# Patient Record
Sex: FEMALE | Race: WHITE | NOT HISPANIC OR LATINO | Employment: OTHER | URBAN - METROPOLITAN AREA
[De-identification: names, ages, dates, MRNs, and addresses within clinical notes are randomized per-mention and may not be internally consistent; named-entity substitution may affect disease eponyms.]

---

## 2024-09-27 ENCOUNTER — HOSPITAL ENCOUNTER (OUTPATIENT)
Dept: RADIOLOGY | Facility: MEDICAL CENTER | Age: 74
End: 2024-09-27

## 2024-09-27 ENCOUNTER — HOSPITAL ENCOUNTER (INPATIENT)
Facility: MEDICAL CENTER | Age: 74
End: 2024-09-27
Attending: INTERNAL MEDICINE | Admitting: INTERNAL MEDICINE
Payer: COMMERCIAL

## 2024-09-27 DIAGNOSIS — S72.002A CLOSED DISPLACED FRACTURE OF LEFT FEMORAL NECK (HCC): ICD-10-CM

## 2024-09-27 PROBLEM — Z01.810 PREOP CARDIOVASCULAR EXAM: Status: ACTIVE | Noted: 2024-09-27

## 2024-09-27 PROBLEM — E87.1 HYPONATREMIA: Status: ACTIVE | Noted: 2024-09-27

## 2024-09-27 PROCEDURE — 700111 HCHG RX REV CODE 636 W/ 250 OVERRIDE (IP): Mod: JZ | Performed by: HOSPITALIST

## 2024-09-27 PROCEDURE — 770001 HCHG ROOM/CARE - MED/SURG/GYN PRIV*

## 2024-09-27 PROCEDURE — 700105 HCHG RX REV CODE 258: Performed by: HOSPITALIST

## 2024-09-27 PROCEDURE — 99223 1ST HOSP IP/OBS HIGH 75: CPT | Mod: AI | Performed by: HOSPITALIST

## 2024-09-27 RX ORDER — HYDROMORPHONE HYDROCHLORIDE 1 MG/ML
0.25 INJECTION, SOLUTION INTRAMUSCULAR; INTRAVENOUS; SUBCUTANEOUS
Status: DISCONTINUED | OUTPATIENT
Start: 2024-09-27 | End: 2024-09-28

## 2024-09-27 RX ORDER — ONDANSETRON 4 MG/1
4 TABLET, ORALLY DISINTEGRATING ORAL EVERY 4 HOURS PRN
Status: DISCONTINUED | OUTPATIENT
Start: 2024-09-27 | End: 2024-10-02 | Stop reason: HOSPADM

## 2024-09-27 RX ORDER — SODIUM CHLORIDE 9 MG/ML
INJECTION, SOLUTION INTRAVENOUS CONTINUOUS
Status: DISCONTINUED | OUTPATIENT
Start: 2024-09-27 | End: 2024-09-29

## 2024-09-27 RX ORDER — ACETAMINOPHEN 325 MG/1
650 TABLET ORAL EVERY 6 HOURS PRN
Status: DISCONTINUED | OUTPATIENT
Start: 2024-09-27 | End: 2024-09-27

## 2024-09-27 RX ORDER — AMOXICILLIN 250 MG
2 CAPSULE ORAL 2 TIMES DAILY
Status: DISCONTINUED | OUTPATIENT
Start: 2024-09-27 | End: 2024-09-28

## 2024-09-27 RX ORDER — POLYETHYLENE GLYCOL 3350 17 G/17G
1 POWDER, FOR SOLUTION ORAL
Status: DISCONTINUED | OUTPATIENT
Start: 2024-09-27 | End: 2024-09-28

## 2024-09-27 RX ORDER — ONDANSETRON 2 MG/ML
4 INJECTION INTRAMUSCULAR; INTRAVENOUS EVERY 4 HOURS PRN
Status: DISCONTINUED | OUTPATIENT
Start: 2024-09-27 | End: 2024-09-28

## 2024-09-27 RX ORDER — HYDROMORPHONE HYDROCHLORIDE 1 MG/ML
0.5 INJECTION, SOLUTION INTRAMUSCULAR; INTRAVENOUS; SUBCUTANEOUS
Status: DISCONTINUED | OUTPATIENT
Start: 2024-09-27 | End: 2024-09-28

## 2024-09-27 RX ORDER — ACETAMINOPHEN 325 MG/1
650 TABLET ORAL EVERY 6 HOURS PRN
Status: DISCONTINUED | OUTPATIENT
Start: 2024-09-27 | End: 2024-09-28

## 2024-09-27 RX ORDER — ONDANSETRON 2 MG/ML
4 INJECTION INTRAMUSCULAR; INTRAVENOUS EVERY 4 HOURS PRN
Status: DISCONTINUED | OUTPATIENT
Start: 2024-09-27 | End: 2024-09-27

## 2024-09-27 RX ADMIN — SODIUM CHLORIDE: 9 INJECTION, SOLUTION INTRAVENOUS at 18:32

## 2024-09-27 RX ADMIN — HYDROMORPHONE HYDROCHLORIDE 0.5 MG: 1 INJECTION, SOLUTION INTRAMUSCULAR; INTRAVENOUS; SUBCUTANEOUS at 19:55

## 2024-09-27 SDOH — ECONOMIC STABILITY: TRANSPORTATION INSECURITY
IN THE PAST 12 MONTHS, HAS LACK OF RELIABLE TRANSPORTATION KEPT YOU FROM MEDICAL APPOINTMENTS, MEETINGS, WORK OR FROM GETTING THINGS NEEDED FOR DAILY LIVING?: NO

## 2024-09-27 SDOH — ECONOMIC STABILITY: TRANSPORTATION INSECURITY
IN THE PAST 12 MONTHS, HAS THE LACK OF TRANSPORTATION KEPT YOU FROM MEDICAL APPOINTMENTS OR FROM GETTING MEDICATIONS?: NO

## 2024-09-27 ASSESSMENT — PAIN DESCRIPTION - PAIN TYPE
TYPE: ACUTE PAIN

## 2024-09-27 ASSESSMENT — LIFESTYLE VARIABLES
DOES PATIENT WANT TO STOP DRINKING: NO
EVER FELT BAD OR GUILTY ABOUT YOUR DRINKING: NO
ALCOHOL_USE: YES
ON A TYPICAL DAY WHEN YOU DRINK ALCOHOL HOW MANY DRINKS DO YOU HAVE: 2
TOTAL SCORE: 0
HAVE YOU EVER FELT YOU SHOULD CUT DOWN ON YOUR DRINKING: NO
HOW MANY TIMES IN THE PAST YEAR HAVE YOU HAD 5 OR MORE DRINKS IN A DAY: 0
TOTAL SCORE: 0
HAVE PEOPLE ANNOYED YOU BY CRITICIZING YOUR DRINKING: NO
TOTAL SCORE: 0
EVER HAD A DRINK FIRST THING IN THE MORNING TO STEADY YOUR NERVES TO GET RID OF A HANGOVER: NO
CONSUMPTION TOTAL: POSITIVE
AVERAGE NUMBER OF DAYS PER WEEK YOU HAVE A DRINK CONTAINING ALCOHOL: 7

## 2024-09-27 ASSESSMENT — ENCOUNTER SYMPTOMS
PALPITATIONS: 0
COUGH: 0
INSOMNIA: 0
DEPRESSION: 0
WEAKNESS: 0
DOUBLE VISION: 0
DIZZINESS: 0
SORE THROAT: 0
FEVER: 0
FALLS: 1
HEADACHES: 0
VOMITING: 0
MYALGIAS: 0
NAUSEA: 0
BRUISES/BLEEDS EASILY: 0
BLURRED VISION: 0
SHORTNESS OF BREATH: 0
NECK PAIN: 0

## 2024-09-27 ASSESSMENT — SOCIAL DETERMINANTS OF HEALTH (SDOH)
WITHIN THE PAST 12 MONTHS, THE FOOD YOU BOUGHT JUST DIDN'T LAST AND YOU DIDN'T HAVE MONEY TO GET MORE: NEVER TRUE
IN THE PAST 12 MONTHS, HAS THE ELECTRIC, GAS, OIL, OR WATER COMPANY THREATENED TO SHUT OFF SERVICE IN YOUR HOME?: NO
WITHIN THE LAST YEAR, HAVE YOU BEEN AFRAID OF YOUR PARTNER OR EX-PARTNER?: NO
WITHIN THE LAST YEAR, HAVE TO BEEN RAPED OR FORCED TO HAVE ANY KIND OF SEXUAL ACTIVITY BY YOUR PARTNER OR EX-PARTNER?: NO
WITHIN THE PAST 12 MONTHS, YOU WORRIED THAT YOUR FOOD WOULD RUN OUT BEFORE YOU GOT THE MONEY TO BUY MORE: NEVER TRUE
WITHIN THE LAST YEAR, HAVE YOU BEEN KICKED, HIT, SLAPPED, OR OTHERWISE PHYSICALLY HURT BY YOUR PARTNER OR EX-PARTNER?: NO
WITHIN THE LAST YEAR, HAVE YOU BEEN HUMILIATED OR EMOTIONALLY ABUSED IN OTHER WAYS BY YOUR PARTNER OR EX-PARTNER?: NO

## 2024-09-27 ASSESSMENT — PATIENT HEALTH QUESTIONNAIRE - PHQ9
2. FEELING DOWN, DEPRESSED, IRRITABLE, OR HOPELESS: NOT AT ALL
1. LITTLE INTEREST OR PLEASURE IN DOING THINGS: NOT AT ALL
SUM OF ALL RESPONSES TO PHQ9 QUESTIONS 1 AND 2: 0

## 2024-09-27 NOTE — PROGRESS NOTES
[facility] DIRECT ADMISSION REPORT  [facility]  Transferring physician: Dr. Penny    Chief complaint: left hip pain  Pertinent history & patient course: 73/F without much past medical history, does not take any chronic medications, who had a GLF on 9/13/24. Since then had 2 negative hip xrays but continued to have pain on the left hip. CT now showed femoral neck fracture. [facility] so transferred to [facility].   Pertinent imaging & lab results: as above  Consultants called prior to transfer and pertinent input from consultants: Ortho    Reason for Transfer: Ortho consult, will need surgery  Further work up or recommendations requested prior to transfer: none    Patient accepted for transfer: Yes  Accepting [facility] Facility: [facility] to notify the Triage Coordinator in the RTOC via Voalte or Phone ext. [phone] when patient arrives to the unit. The Triage Coordinator will assign the admitting provider.    Consultants to be called upon arrival: Orthopedics on-call  Admission status: Inpatient.   Floor requested: T3      The admitting provider is the point of contact for questions or concerns regarding patient's care.

## 2024-09-28 ENCOUNTER — ANESTHESIA EVENT (OUTPATIENT)
Dept: SURGERY | Facility: MEDICAL CENTER | Age: 74
End: 2024-09-28
Payer: COMMERCIAL

## 2024-09-28 ENCOUNTER — ANESTHESIA (OUTPATIENT)
Dept: SURGERY | Facility: MEDICAL CENTER | Age: 74
End: 2024-09-28
Payer: COMMERCIAL

## 2024-09-28 LAB
ANION GAP SERPL CALC-SCNC: 13 MMOL/L (ref 7–16)
BUN SERPL-MCNC: 10 MG/DL (ref 8–22)
CALCIUM SERPL-MCNC: 9.2 MG/DL (ref 8.5–10.5)
CHLORIDE SERPL-SCNC: 106 MMOL/L (ref 96–112)
CO2 SERPL-SCNC: 22 MMOL/L (ref 20–33)
CREAT SERPL-MCNC: 0.68 MG/DL (ref 0.5–1.4)
EKG IMPRESSION: NORMAL
ERYTHROCYTE [DISTWIDTH] IN BLOOD BY AUTOMATED COUNT: 42.1 FL (ref 35.9–50)
GFR SERPLBLD CREATININE-BSD FMLA CKD-EPI: 91 ML/MIN/1.73 M 2
GLUCOSE SERPL-MCNC: 106 MG/DL (ref 65–99)
HCT VFR BLD AUTO: 37.5 % (ref 37–47)
HGB BLD-MCNC: 12.8 G/DL (ref 12–16)
MCH RBC QN AUTO: 31.8 PG (ref 27–33)
MCHC RBC AUTO-ENTMCNC: 34.1 G/DL (ref 32.2–35.5)
MCV RBC AUTO: 93.3 FL (ref 81.4–97.8)
PLATELET # BLD AUTO: 336 K/UL (ref 164–446)
PMV BLD AUTO: 9.7 FL (ref 9–12.9)
POTASSIUM SERPL-SCNC: 3.8 MMOL/L (ref 3.6–5.5)
RBC # BLD AUTO: 4.02 M/UL (ref 4.2–5.4)
SODIUM SERPL-SCNC: 141 MMOL/L (ref 135–145)
WBC # BLD AUTO: 6.1 K/UL (ref 4.8–10.8)

## 2024-09-28 PROCEDURE — 700111 HCHG RX REV CODE 636 W/ 250 OVERRIDE (IP): Mod: JZ | Performed by: ANESTHESIOLOGY

## 2024-09-28 PROCEDURE — 700111 HCHG RX REV CODE 636 W/ 250 OVERRIDE (IP): Performed by: ANESTHESIOLOGY

## 2024-09-28 PROCEDURE — 93005 ELECTROCARDIOGRAM TRACING: CPT | Performed by: ORTHOPAEDIC SURGERY

## 2024-09-28 PROCEDURE — 160048 HCHG OR STATISTICAL LEVEL 1-5: Performed by: ORTHOPAEDIC SURGERY

## 2024-09-28 PROCEDURE — 700111 HCHG RX REV CODE 636 W/ 250 OVERRIDE (IP): Performed by: ORTHOPAEDIC SURGERY

## 2024-09-28 PROCEDURE — C1776 JOINT DEVICE (IMPLANTABLE): HCPCS | Performed by: ORTHOPAEDIC SURGERY

## 2024-09-28 PROCEDURE — A9270 NON-COVERED ITEM OR SERVICE: HCPCS | Performed by: ORTHOPAEDIC SURGERY

## 2024-09-28 PROCEDURE — 700105 HCHG RX REV CODE 258: Performed by: ANESTHESIOLOGY

## 2024-09-28 PROCEDURE — 700102 HCHG RX REV CODE 250 W/ 637 OVERRIDE(OP): Performed by: ANESTHESIOLOGY

## 2024-09-28 PROCEDURE — 700105 HCHG RX REV CODE 258: Performed by: ORTHOPAEDIC SURGERY

## 2024-09-28 PROCEDURE — C1713 ANCHOR/SCREW BN/BN,TIS/BN: HCPCS | Performed by: ORTHOPAEDIC SURGERY

## 2024-09-28 PROCEDURE — 160009 HCHG ANES TIME/MIN: Performed by: ORTHOPAEDIC SURGERY

## 2024-09-28 PROCEDURE — 502000 HCHG MISC OR IMPLANTS RC 0278: Performed by: ORTHOPAEDIC SURGERY

## 2024-09-28 PROCEDURE — A9270 NON-COVERED ITEM OR SERVICE: HCPCS | Performed by: ANESTHESIOLOGY

## 2024-09-28 PROCEDURE — 99222 1ST HOSP IP/OBS MODERATE 55: CPT | Mod: 57 | Performed by: ORTHOPAEDIC SURGERY

## 2024-09-28 PROCEDURE — 770001 HCHG ROOM/CARE - MED/SURG/GYN PRIV*

## 2024-09-28 PROCEDURE — 700101 HCHG RX REV CODE 250: Performed by: ANESTHESIOLOGY

## 2024-09-28 PROCEDURE — 27236 TREAT THIGH FRACTURE: CPT | Mod: LT | Performed by: ORTHOPAEDIC SURGERY

## 2024-09-28 PROCEDURE — 93010 ELECTROCARDIOGRAM REPORT: CPT | Performed by: INTERNAL MEDICINE

## 2024-09-28 PROCEDURE — 160031 HCHG SURGERY MINUTES - 1ST 30 MINS LEVEL 5: Performed by: ORTHOPAEDIC SURGERY

## 2024-09-28 PROCEDURE — 700111 HCHG RX REV CODE 636 W/ 250 OVERRIDE (IP): Mod: JZ | Performed by: HOSPITALIST

## 2024-09-28 PROCEDURE — 36415 COLL VENOUS BLD VENIPUNCTURE: CPT

## 2024-09-28 PROCEDURE — 160002 HCHG RECOVERY MINUTES (STAT): Performed by: ORTHOPAEDIC SURGERY

## 2024-09-28 PROCEDURE — A9270 NON-COVERED ITEM OR SERVICE: HCPCS | Performed by: HOSPITALIST

## 2024-09-28 PROCEDURE — 700105 HCHG RX REV CODE 258: Performed by: HOSPITALIST

## 2024-09-28 PROCEDURE — 0SRS0J9 REPLACEMENT OF LEFT HIP JOINT, FEMORAL SURFACE WITH SYNTHETIC SUBSTITUTE, CEMENTED, OPEN APPROACH: ICD-10-PCS | Performed by: ORTHOPAEDIC SURGERY

## 2024-09-28 PROCEDURE — 160035 HCHG PACU - 1ST 60 MINS PHASE I: Performed by: ORTHOPAEDIC SURGERY

## 2024-09-28 PROCEDURE — 700102 HCHG RX REV CODE 250 W/ 637 OVERRIDE(OP): Performed by: ORTHOPAEDIC SURGERY

## 2024-09-28 PROCEDURE — 160042 HCHG SURGERY MINUTES - EA ADDL 1 MIN LEVEL 5: Performed by: ORTHOPAEDIC SURGERY

## 2024-09-28 PROCEDURE — 700102 HCHG RX REV CODE 250 W/ 637 OVERRIDE(OP): Performed by: HOSPITALIST

## 2024-09-28 PROCEDURE — 80048 BASIC METABOLIC PNL TOTAL CA: CPT

## 2024-09-28 PROCEDURE — 85027 COMPLETE CBC AUTOMATED: CPT

## 2024-09-28 DEVICE — CEMENT ORTHOPEDIC HV US (10/PK): Type: IMPLANTABLE DEVICE | Site: HIP | Status: FUNCTIONAL

## 2024-09-28 DEVICE — HEAD FEMORAL 0 HIP C TAPER 26MM COCR LFIT (1EA): Type: IMPLANTABLE DEVICE | Site: HIP | Status: FUNCTIONAL

## 2024-09-28 DEVICE — IMPLANTABLE DEVICE: Type: IMPLANTABLE DEVICE | Site: HIP | Status: FUNCTIONAL

## 2024-09-28 RX ORDER — BISACODYL 10 MG
10 SUPPOSITORY, RECTAL RECTAL
Status: DISCONTINUED | OUTPATIENT
Start: 2024-09-28 | End: 2024-10-02 | Stop reason: HOSPADM

## 2024-09-28 RX ORDER — DOCUSATE SODIUM 100 MG/1
100 CAPSULE, LIQUID FILLED ORAL 2 TIMES DAILY
Status: DISCONTINUED | OUTPATIENT
Start: 2024-09-28 | End: 2024-10-02 | Stop reason: HOSPADM

## 2024-09-28 RX ORDER — KETOROLAC TROMETHAMINE 15 MG/ML
INJECTION, SOLUTION INTRAMUSCULAR; INTRAVENOUS PRN
Status: DISCONTINUED | OUTPATIENT
Start: 2024-09-28 | End: 2024-09-28 | Stop reason: SURG

## 2024-09-28 RX ORDER — ONDANSETRON 2 MG/ML
4 INJECTION INTRAMUSCULAR; INTRAVENOUS
Status: DISCONTINUED | OUTPATIENT
Start: 2024-09-28 | End: 2024-09-28 | Stop reason: HOSPADM

## 2024-09-28 RX ORDER — OXYCODONE HYDROCHLORIDE 5 MG/1
5 TABLET ORAL
Status: DISCONTINUED | OUTPATIENT
Start: 2024-09-28 | End: 2024-10-02 | Stop reason: HOSPADM

## 2024-09-28 RX ORDER — HYDROMORPHONE HYDROCHLORIDE 1 MG/ML
0.2 INJECTION, SOLUTION INTRAMUSCULAR; INTRAVENOUS; SUBCUTANEOUS
Status: DISCONTINUED | OUTPATIENT
Start: 2024-09-28 | End: 2024-09-28 | Stop reason: HOSPADM

## 2024-09-28 RX ORDER — OXYCODONE HCL 5 MG/5 ML
5 SOLUTION, ORAL ORAL
Status: COMPLETED | OUTPATIENT
Start: 2024-09-28 | End: 2024-09-28

## 2024-09-28 RX ORDER — SCOLOPAMINE TRANSDERMAL SYSTEM 1 MG/1
1 PATCH, EXTENDED RELEASE TRANSDERMAL
Status: DISCONTINUED | OUTPATIENT
Start: 2024-09-28 | End: 2024-10-02 | Stop reason: HOSPADM

## 2024-09-28 RX ORDER — KETOROLAC TROMETHAMINE 15 MG/ML
15 INJECTION, SOLUTION INTRAMUSCULAR; INTRAVENOUS EVERY 6 HOURS
Status: DISCONTINUED | OUTPATIENT
Start: 2024-09-28 | End: 2024-09-29

## 2024-09-28 RX ORDER — AMOXICILLIN 250 MG
1 CAPSULE ORAL NIGHTLY
Status: DISCONTINUED | OUTPATIENT
Start: 2024-09-28 | End: 2024-10-02 | Stop reason: HOSPADM

## 2024-09-28 RX ORDER — ONDANSETRON 2 MG/ML
4 INJECTION INTRAMUSCULAR; INTRAVENOUS EVERY 4 HOURS PRN
Status: DISCONTINUED | OUTPATIENT
Start: 2024-09-28 | End: 2024-10-02 | Stop reason: HOSPADM

## 2024-09-28 RX ORDER — ONDANSETRON 2 MG/ML
INJECTION INTRAMUSCULAR; INTRAVENOUS PRN
Status: DISCONTINUED | OUTPATIENT
Start: 2024-09-28 | End: 2024-09-28 | Stop reason: SURG

## 2024-09-28 RX ORDER — DEXMEDETOMIDINE HYDROCHLORIDE 100 UG/ML
INJECTION, SOLUTION INTRAVENOUS PRN
Status: DISCONTINUED | OUTPATIENT
Start: 2024-09-28 | End: 2024-09-28 | Stop reason: SURG

## 2024-09-28 RX ORDER — HYDROMORPHONE HYDROCHLORIDE 1 MG/ML
0.1 INJECTION, SOLUTION INTRAMUSCULAR; INTRAVENOUS; SUBCUTANEOUS
Status: DISCONTINUED | OUTPATIENT
Start: 2024-09-28 | End: 2024-09-28 | Stop reason: HOSPADM

## 2024-09-28 RX ORDER — SODIUM CHLORIDE, SODIUM LACTATE, POTASSIUM CHLORIDE, CALCIUM CHLORIDE 600; 310; 30; 20 MG/100ML; MG/100ML; MG/100ML; MG/100ML
INJECTION, SOLUTION INTRAVENOUS CONTINUOUS
Status: DISCONTINUED | OUTPATIENT
Start: 2024-09-28 | End: 2024-09-28 | Stop reason: HOSPADM

## 2024-09-28 RX ORDER — HYDROMORPHONE HYDROCHLORIDE 2 MG/ML
INJECTION, SOLUTION INTRAMUSCULAR; INTRAVENOUS; SUBCUTANEOUS PRN
Status: DISCONTINUED | OUTPATIENT
Start: 2024-09-28 | End: 2024-09-28 | Stop reason: SURG

## 2024-09-28 RX ORDER — SODIUM PHOSPHATE,MONO-DIBASIC 19G-7G/118
1 ENEMA (ML) RECTAL
Status: DISCONTINUED | OUTPATIENT
Start: 2024-09-28 | End: 2024-10-02 | Stop reason: HOSPADM

## 2024-09-28 RX ORDER — ACETAMINOPHEN 325 MG/1
650 TABLET ORAL EVERY 6 HOURS
Status: DISCONTINUED | OUTPATIENT
Start: 2024-09-28 | End: 2024-10-02 | Stop reason: HOSPADM

## 2024-09-28 RX ORDER — ACETAMINOPHEN 325 MG/1
650 TABLET ORAL EVERY 6 HOURS PRN
Status: DISCONTINUED | OUTPATIENT
Start: 2024-10-03 | End: 2024-10-02 | Stop reason: HOSPADM

## 2024-09-28 RX ORDER — OXYCODONE HCL 5 MG/5 ML
10 SOLUTION, ORAL ORAL
Status: COMPLETED | OUTPATIENT
Start: 2024-09-28 | End: 2024-09-28

## 2024-09-28 RX ORDER — ENOXAPARIN SODIUM 100 MG/ML
40 INJECTION SUBCUTANEOUS
Status: DISCONTINUED | OUTPATIENT
Start: 2024-09-28 | End: 2024-10-02 | Stop reason: HOSPADM

## 2024-09-28 RX ORDER — MIDAZOLAM HYDROCHLORIDE 1 MG/ML
INJECTION INTRAMUSCULAR; INTRAVENOUS PRN
Status: DISCONTINUED | OUTPATIENT
Start: 2024-09-28 | End: 2024-09-28 | Stop reason: SURG

## 2024-09-28 RX ORDER — TRANEXAMIC ACID 100 MG/ML
INJECTION, SOLUTION INTRAVENOUS PRN
Status: DISCONTINUED | OUTPATIENT
Start: 2024-09-28 | End: 2024-09-28 | Stop reason: SURG

## 2024-09-28 RX ORDER — HALOPERIDOL 5 MG/ML
1 INJECTION INTRAMUSCULAR EVERY 6 HOURS PRN
Status: DISCONTINUED | OUTPATIENT
Start: 2024-09-28 | End: 2024-10-02 | Stop reason: HOSPADM

## 2024-09-28 RX ORDER — HYDROMORPHONE HYDROCHLORIDE 1 MG/ML
0.5 INJECTION, SOLUTION INTRAMUSCULAR; INTRAVENOUS; SUBCUTANEOUS
Status: DISCONTINUED | OUTPATIENT
Start: 2024-09-28 | End: 2024-10-02 | Stop reason: HOSPADM

## 2024-09-28 RX ORDER — DEXAMETHASONE SODIUM PHOSPHATE 4 MG/ML
4 INJECTION, SOLUTION INTRA-ARTICULAR; INTRALESIONAL; INTRAMUSCULAR; INTRAVENOUS; SOFT TISSUE
Status: DISCONTINUED | OUTPATIENT
Start: 2024-09-28 | End: 2024-10-02 | Stop reason: HOSPADM

## 2024-09-28 RX ORDER — AMOXICILLIN 250 MG
1 CAPSULE ORAL
Status: DISCONTINUED | OUTPATIENT
Start: 2024-09-28 | End: 2024-10-02 | Stop reason: HOSPADM

## 2024-09-28 RX ORDER — DIPHENHYDRAMINE HYDROCHLORIDE 50 MG/ML
25 INJECTION INTRAMUSCULAR; INTRAVENOUS EVERY 6 HOURS PRN
Status: DISCONTINUED | OUTPATIENT
Start: 2024-09-28 | End: 2024-10-02 | Stop reason: HOSPADM

## 2024-09-28 RX ORDER — HYDROMORPHONE HYDROCHLORIDE 1 MG/ML
0.4 INJECTION, SOLUTION INTRAMUSCULAR; INTRAVENOUS; SUBCUTANEOUS
Status: DISCONTINUED | OUTPATIENT
Start: 2024-09-28 | End: 2024-09-28 | Stop reason: HOSPADM

## 2024-09-28 RX ORDER — SODIUM CHLORIDE, SODIUM LACTATE, POTASSIUM CHLORIDE, CALCIUM CHLORIDE 600; 310; 30; 20 MG/100ML; MG/100ML; MG/100ML; MG/100ML
INJECTION, SOLUTION INTRAVENOUS
Status: DISCONTINUED | OUTPATIENT
Start: 2024-09-28 | End: 2024-09-28 | Stop reason: SURG

## 2024-09-28 RX ORDER — OXYCODONE HYDROCHLORIDE 10 MG/1
10 TABLET ORAL
Status: DISCONTINUED | OUTPATIENT
Start: 2024-09-28 | End: 2024-10-02 | Stop reason: HOSPADM

## 2024-09-28 RX ORDER — IBUPROFEN 800 MG/1
800 TABLET, FILM COATED ORAL 3 TIMES DAILY PRN
Status: DISCONTINUED | OUTPATIENT
Start: 2024-10-01 | End: 2024-09-29

## 2024-09-28 RX ORDER — POLYETHYLENE GLYCOL 3350 17 G/17G
1 POWDER, FOR SOLUTION ORAL 2 TIMES DAILY PRN
Status: DISCONTINUED | OUTPATIENT
Start: 2024-09-28 | End: 2024-10-02 | Stop reason: HOSPADM

## 2024-09-28 RX ORDER — DIPHENHYDRAMINE HYDROCHLORIDE 50 MG/ML
12.5 INJECTION INTRAMUSCULAR; INTRAVENOUS
Status: DISCONTINUED | OUTPATIENT
Start: 2024-09-28 | End: 2024-09-28 | Stop reason: HOSPADM

## 2024-09-28 RX ORDER — HALOPERIDOL 5 MG/ML
1 INJECTION INTRAMUSCULAR
Status: DISCONTINUED | OUTPATIENT
Start: 2024-09-28 | End: 2024-09-28 | Stop reason: HOSPADM

## 2024-09-28 RX ORDER — CEFAZOLIN SODIUM 1 G/3ML
INJECTION, POWDER, FOR SOLUTION INTRAMUSCULAR; INTRAVENOUS PRN
Status: DISCONTINUED | OUTPATIENT
Start: 2024-09-28 | End: 2024-09-28 | Stop reason: SURG

## 2024-09-28 RX ORDER — DEXAMETHASONE SODIUM PHOSPHATE 4 MG/ML
INJECTION, SOLUTION INTRA-ARTICULAR; INTRALESIONAL; INTRAMUSCULAR; INTRAVENOUS; SOFT TISSUE PRN
Status: DISCONTINUED | OUTPATIENT
Start: 2024-09-28 | End: 2024-09-28 | Stop reason: SURG

## 2024-09-28 RX ADMIN — DEXAMETHASONE SODIUM PHOSPHATE 4 MG: 4 INJECTION INTRA-ARTICULAR; INTRALESIONAL; INTRAMUSCULAR; INTRAVENOUS; SOFT TISSUE at 08:31

## 2024-09-28 RX ADMIN — DEXMEDETOMIDINE HYDROCHLORIDE 10 MCG: 100 INJECTION, SOLUTION INTRAVENOUS at 08:01

## 2024-09-28 RX ADMIN — DOCUSATE SODIUM 100 MG: 100 CAPSULE, LIQUID FILLED ORAL at 16:40

## 2024-09-28 RX ADMIN — HYDROMORPHONE HYDROCHLORIDE 0.2 MG: 1 INJECTION, SOLUTION INTRAMUSCULAR; INTRAVENOUS; SUBCUTANEOUS at 09:22

## 2024-09-28 RX ADMIN — TRANEXAMIC ACID 1000 MG: 100 INJECTION, SOLUTION INTRAVENOUS at 07:56

## 2024-09-28 RX ADMIN — ENOXAPARIN SODIUM 40 MG: 100 INJECTION SUBCUTANEOUS at 19:51

## 2024-09-28 RX ADMIN — HYDROMORPHONE HYDROCHLORIDE 0.5 MG: 2 INJECTION INTRAMUSCULAR; INTRAVENOUS; SUBCUTANEOUS at 08:17

## 2024-09-28 RX ADMIN — ONDANSETRON 4 MG: 2 INJECTION INTRAMUSCULAR; INTRAVENOUS at 08:31

## 2024-09-28 RX ADMIN — OXYCODONE 5 MG: 5 TABLET ORAL at 16:40

## 2024-09-28 RX ADMIN — FENTANYL CITRATE 50 MCG: 50 INJECTION, SOLUTION INTRAMUSCULAR; INTRAVENOUS at 09:09

## 2024-09-28 RX ADMIN — SENNOSIDES AND DOCUSATE SODIUM 1 TABLET: 50; 8.6 TABLET ORAL at 19:51

## 2024-09-28 RX ADMIN — OXYCODONE HYDROCHLORIDE 10 MG: 10 TABLET ORAL at 19:50

## 2024-09-28 RX ADMIN — FENTANYL CITRATE 50 MCG: 50 INJECTION, SOLUTION INTRAMUSCULAR; INTRAVENOUS at 09:15

## 2024-09-28 RX ADMIN — KETOROLAC TROMETHAMINE 15 MG: 15 INJECTION, SOLUTION INTRAMUSCULAR; INTRAVENOUS at 11:28

## 2024-09-28 RX ADMIN — SODIUM CHLORIDE: 9 INJECTION, SOLUTION INTRAVENOUS at 11:26

## 2024-09-28 RX ADMIN — OXYCODONE HYDROCHLORIDE 10 MG: 5 SOLUTION ORAL at 09:09

## 2024-09-28 RX ADMIN — KETOROLAC TROMETHAMINE 15 MG: 15 INJECTION, SOLUTION INTRAMUSCULAR; INTRAVENOUS at 07:56

## 2024-09-28 RX ADMIN — CEFAZOLIN 2 G: 1 INJECTION, POWDER, FOR SOLUTION INTRAMUSCULAR; INTRAVENOUS at 07:41

## 2024-09-28 RX ADMIN — SODIUM CHLORIDE, POTASSIUM CHLORIDE, SODIUM LACTATE AND CALCIUM CHLORIDE: 600; 310; 30; 20 INJECTION, SOLUTION INTRAVENOUS at 07:38

## 2024-09-28 RX ADMIN — MIDAZOLAM HYDROCHLORIDE 1 MG: 2 INJECTION, SOLUTION INTRAMUSCULAR; INTRAVENOUS at 07:41

## 2024-09-28 RX ADMIN — SENNOSIDES AND DOCUSATE SODIUM 2 TABLET: 50; 8.6 TABLET ORAL at 05:15

## 2024-09-28 RX ADMIN — HYDROMORPHONE HYDROCHLORIDE 0.5 MG: 2 INJECTION INTRAMUSCULAR; INTRAVENOUS; SUBCUTANEOUS at 07:41

## 2024-09-28 RX ADMIN — KETOROLAC TROMETHAMINE 15 MG: 15 INJECTION, SOLUTION INTRAMUSCULAR; INTRAVENOUS at 16:40

## 2024-09-28 RX ADMIN — KETOROLAC TROMETHAMINE 15 MG: 15 INJECTION, SOLUTION INTRAMUSCULAR; INTRAVENOUS at 23:32

## 2024-09-28 RX ADMIN — SUGAMMADEX 200 MG: 100 INJECTION, SOLUTION INTRAVENOUS at 08:29

## 2024-09-28 RX ADMIN — ROCURONIUM BROMIDE 50 MG: 10 INJECTION, SOLUTION INTRAVENOUS at 07:41

## 2024-09-28 RX ADMIN — PROPOFOL 120 MG: 10 INJECTION, EMULSION INTRAVENOUS at 07:41

## 2024-09-28 RX ADMIN — HYDROMORPHONE HYDROCHLORIDE 0.5 MG: 1 INJECTION, SOLUTION INTRAMUSCULAR; INTRAVENOUS; SUBCUTANEOUS at 01:50

## 2024-09-28 RX ADMIN — CEFAZOLIN 2 G: 2 INJECTION, POWDER, FOR SOLUTION INTRAMUSCULAR; INTRAVENOUS at 11:33

## 2024-09-28 ASSESSMENT — PAIN DESCRIPTION - PAIN TYPE
TYPE: SURGICAL PAIN
TYPE: SURGICAL PAIN
TYPE: ACUTE PAIN
TYPE: SURGICAL PAIN

## 2024-09-28 ASSESSMENT — PAIN SCALES - GENERAL: PAIN_LEVEL: 3

## 2024-09-28 ASSESSMENT — PATIENT HEALTH QUESTIONNAIRE - PHQ9
1. LITTLE INTEREST OR PLEASURE IN DOING THINGS: NOT AT ALL
SUM OF ALL RESPONSES TO PHQ9 QUESTIONS 1 AND 2: 0
2. FEELING DOWN, DEPRESSED, IRRITABLE, OR HOPELESS: NOT AT ALL

## 2024-09-28 NOTE — OR NURSING
0839 - Patient arrived from OR via bed. Report received from Anesthesia and Nursing staff. Vitals stable, no distress noted, orders reviewed and released.     Patient transported to room via gurney accompanied by transport. Vitals stable, no distress noted. Family updated, Report given to TRISTON Medrano.

## 2024-09-28 NOTE — ASSESSMENT & PLAN NOTE
Cardiovascular:   Patient does not have history of CHF  Pre-op EKG: Yes    Pulmonary:  Oxygen per protocol    GI:   No history of cirrhosis. Standard bowel regimen. Hold for loose stools.    Renal:   IV fluids: -150 mL/hr for 2 days   Labs: Metabolic Panel with AM labs    Musculoskeletal:   Check 25 OH vitamin D level. If 31-40 pg/mL, consider starting vitamin D3 1000 IU PO daily. If 20-30 pg/mL, consider vitamin D3 2000 IU PO daily. If <20 pg/mL, vitamin D2 50,000 IU weekly x 8 weeks then 2000 IU PO daily.    Neurologic:   Pain Control: Neuro checks every 4 hours  Avoid fentanyl (short-acting)  Acetaminophen 1000 mg PO TID; 650 mg PO TID if liver problems    Hematologic:  Plan on pharmacologic DVT prophylaxis post operative day #1. Hold for decreasing hemoglobin. Notify provider for hemoglobin less than 8.  Order preoperative type and cross.   If patient was on anticoagulation prior to arrival risks and benefits will be weighed by teams including surgery, hospitalist, geriatrics, and anesthesia for delaying surgery more than 24 hours.   On anticoagulation prior to arrival: No    Medical Assessment Risk:  Intermediate    Surgical Risk:   Intermediate    No prior PMH, she is medically optimized for Surgical procedure without additional workup

## 2024-09-28 NOTE — H&P
Hospital Medicine History & Physical Note    Date of Service  9/27/2024    Primary Care Physician  JC Nelson D.O.    Consultants  orthopedics    Specialist Names: I discussed this case with Dr. Schroeder    Code Status  Full Code    Chief Complaint  [facility]    History of Presenting Illness  Ashley Gillespie is a 73 y.o. female who is [facility] Fracture on 9/27/2024. Patient fell on 9/13. She lost balance when opening the door for her Dog. [facility], and no fracture was found, however pain is getting progressively worse. She purchased a cane to help with ambulation but [facility] CT pelvis was done showing Left Subcapital fracture. She is transferred to [facility] because there are no [facility]opedic Sur[facility] available [facility] the [facility] until Monday to perform procedure.     I discussed the plan of care with patient and Orthopedic Surgeon Dr. Schroeder .    Review of Systems  Review of Systems   Constitutional:  Negative for fever.   HENT:  Negative for congestion and sore throat.    Eyes:  Negative for blurred vision and double vision.   Respiratory:  Negative for cough and shortness of breath.    Cardiovascular:  Negative for chest pain and palpitations.   Gastrointestinal:  Negative for nausea and vomiting.   Genitourinary:  Negative for dysuria and urgency.   Musculoskeletal:  Positive for falls and joint pain. Negative for myalgias and neck pain.   Skin:  Negative for itching and rash.   Neurological:  Negative for dizziness, weakness and headaches.   Endo/Heme/Allergies:  Does not bruise/bleed easily.   Psychiatric/Behavioral:  Negative for depression. The patient does not have insomnia.        Past Medical History   has a past medical history of No known health problems.    Surgical History   has a past surgical history that includes abdominal  hysterectomy total and appendectomy.     Family History  Reviewed and not pertinent  Family history reviewed with patient. There is no family history that is pertinent to the chief complaint.     Social History   reports that she has never smoked. She has never used smokeless tobacco.    Allergies  Allergies   Allergen Reactions    Morphine Rash     Per pt all over chest    Codeine     Penicillins        Medications  None       Physical Exam  Temp:  [36.3 °C (97.3 °F)] 36.3 °C (97.3 °F)  Pulse:  [59] 59  Resp:  [18] 18  BP: (140)/(67) 140/67  SpO2:  [96 %] 96 %      Physical Exam  Constitutional:       Appearance: Normal appearance.   HENT:      Head: Normocephalic and atraumatic.      Mouth/Throat:      Mouth: Mucous membranes are moist.      Pharynx: Oropharynx is clear.   Eyes:      Extraocular Movements: Extraocular movements intact.      Pupils: Pupils are equal, round, and reactive to light.   Cardiovascular:      Rate and Rhythm: Normal rate and regular rhythm.      Heart sounds: Normal heart sounds.   Pulmonary:      Effort: Pulmonary effort is normal.      Breath sounds: Normal breath sounds.   Abdominal:      General: Abdomen is flat. Bowel sounds are normal.      Palpations: Abdomen is soft.   Musculoskeletal:      Cervical back: Normal range of motion and neck supple.      Comments: Left leg: pain with ROM   Skin:     General: Skin is warm and dry.   Neurological:      General: No focal deficit present.      Mental Status: She is alert and oriented to person, place, and time.   Psychiatric:         Mood and Affect: Mood normal.         Behavior: Behavior normal.         Laboratory:          Outside Facility Laboratory:  CBC: WBC 7.72, Hgb 14.3, Platelets 379  CMP: Na 135, K 3.7, Chloride 102, CO2 25, Glucose 103, Creatinine 0.8, BUN 14, Calcium 9.5, Alk Phos 80, T Bili 0.8, AST 16, ALT 9      Imaging:  Outside Facility Imaging:  CT Pelvis WO:  IMPRESSION: Acute subcapital femoral neck  fracture          Outside Facility EKG (My personal review and interpretation)  NSR at 62 bpm, no acute ST elevation      I provided extensive external medical records review    Assessment/Plan:  Justification for Admission Status  I anticipate this patient will require at least two midnights for appropriate medical management, necessitating inpatient admission [facility]        * Closed left hip fracture, initial encounter (HCC)- (present on admission)  Assessment & Plan  -Inpatient status: Medical Floor  -NPO at 12 am: OK to have a light meal before 12 am.   -[facility]: Acute subcapital femoral neck fracture. Images were uploaded.  -I appreciate Orthopedic Consult and recommendations. I personally discussed this case with Dr. Schroeder. Plan for OR in am  -Pain control with IV narcotics    Preop cardiovascular exam  Assessment & Plan  Cardiovascular:   Patient does not have history of CHF  Pre-op EKG: Yes    Pulmonary:  Oxygen per protocol    GI:   No history of cirrhosis. Standard bowel regimen. Hold for loose stools.    Renal:   IV fluids: -150 mL/hr for 2 days   Labs: Metabolic Panel with AM labs    Musculoskeletal:   Check 25 OH vitamin D level. If 31-40 pg/mL, consider starting vitamin D3 1000 IU PO daily. If 20-30 pg/mL, consider vitamin D3 2000 IU PO daily. If <20 pg/mL, vitamin D2 50,000 IU weekly x 8 weeks then 2000 IU PO daily.    Neurologic:   Pain Control: Neuro checks every 4 hours  Avoid fentanyl (short-acting)  Acetaminophen 1000 mg PO TID; 650 mg PO TID if liver problems    Hematologic:  Plan on pharmacologic DVT prophylaxis post operative day #1. Hold for decreasing hemoglobin. Notify provider for hemoglobin less than 8.  Order preoperative type and cross.   If patient was on anticoagulation prior to arrival risks and benefits will be weighed by teams including surgery, hospitalist, geriatrics, and anesthesia for delaying  surgery more than 24 hours.   On anticoagulation prior to arrival: No    Medical Assessment Risk:  Intermediate    Surgical Risk:   Intermediate    No prior PMH, she is medically optimized for Surgical procedure without additional workup      Hyponatremia  Assessment & Plan  Na at outside facility was 135. This is likely reactive. Monitor chemistry panel in am        VTE prophylaxis: SCDs/TEDs

## 2024-09-28 NOTE — ANESTHESIA PROCEDURE NOTES
Airway    Date/Time: 9/28/2024 7:42 AM    Performed by: Jeffy De León M.D.  Authorized by: Jeffy De León M.D.    Location:  OR  Urgency:  Elective  Indications for Airway Management:  Anesthesia      Spontaneous Ventilation: absent    Sedation Level:  Deep  Preoxygenated: Yes    Patient Position:  Sniffing  Final Airway Type:  Endotracheal airway  Final Endotracheal Airway:  ETT  Cuffed: Yes    Technique Used for Successful ETT Placement:  Direct laryngoscopy    Insertion Site:  Oral  Blade Type:  Peg  Laryngoscope Blade/Videolaryngoscope Blade Size:  3  ETT Size (mm):  6.5  Measured from:  Teeth  ETT to Teeth (cm):  22  Placement Verified by: auscultation and capnometry    Cormack-Lehane Classification:  Grade I - full view of glottis  Number of Attempts at Approach:  1

## 2024-09-28 NOTE — ANESTHESIA POSTPROCEDURE EVALUATION
Patient: Ashley Gillespie    Procedure Summary       Date: 09/28/24 Room / Location: [place] [id number] / SURGERY [facility]    Anesthesia Start: 0738 Anesthesia Stop: 0842    Procedure: HEMIARTHROPLASTY, HIP (Left: Hip) Diagnosis: (LEFT FEMORAL NECK FRACTURE)    Surgeons: David Schroeder M.D. Responsible Provider: Jeffy De León M.D.    Anesthesia Type: general ASA Status: 1            Final Anesthesia Type: general  Last vitals  BP   Blood Pressure : (!) 158/74    Temp   36 °C (96.8 °F)    Pulse   72   Resp   16    SpO2   94 %      Anesthesia Post Evaluation    Patient location during evaluation: PACU  Patient participation: complete - patient participated  Level of consciousness: awake and alert  Pain score: 3    Airway patency: patent  Anesthetic complications: no  Cardiovascular status: hemodynamically stable  Respiratory status: acceptable  Hydration status: euvolemic    PONV: none          No notable events documented.     Nurse Pain Score: 2 (NPRS)           57

## 2024-09-28 NOTE — CONSULTS
9/28/2024    The patient was seen at the request of Dr Quan    HPI: Ashley Gillespie is a 73 y.o. female who presents with complaints of pain to left hip .  This started yesterday after fall.  The pain is 8/10 and is described as sharp.  The pain is made worse by palpation of the area and made better by rest and immobilization.CT showed displaced femoral neck fracture    Past Medical History:   Diagnosis Date    No known health problems        Past Surgical History:   Procedure Laterality Date    ABDOMINAL HYSTERECTOMY TOTAL      APPENDECTOMY         Medications  No current facility-administered medications on file prior to encounter.     No current outpatient medications on file prior to encounter.       Allergies  Morphine, Codeine, and Penicillins    ROS  Left hip pain. All other systems were reviewed and found to be negative    History reviewed. No pertinent family history.    Social History     Socioeconomic History    Marital status: Single   Tobacco Use    Smoking status: Never    Smokeless tobacco: Never     Social Determinants of Health     Food Insecurity: No Food Insecurity (9/27/2024)    Hunger Vital Sign     Worried About Running Out of Food in the Last Year: Never true     Ran Out of Food in the Last Year: Never true   Transportation Needs: No Transportation Needs (9/27/2024)    PRAPARE - Transportation     Lack of Transportation (Medical): No     Lack of Transportation (Non-Medical): No   Intimate Partner Violence: Not At Risk (9/27/2024)    Humiliation, Afraid, Rape, and Kick questionnaire     Fear of Current or Ex-Partner: No     Emotionally Abused: No     Physically Abused: No     Sexually Abused: No   Housing Stability: Low Risk  (9/27/2024)    Housing Stability Vital Sign     Unable to Pay for Housing in the Last Year: No     Number of Times Moved in the Last Year: 1     Homeless in the Last Year: No       Physical Exam  Vitals  BP (!) 158/74   Pulse 72   Temp 36 °C (96.8 °F) (Temporal)    "Resp 16   Ht 1.727 m (5' 8\")   Wt 68.1 kg (150 lb 2.1 oz)   SpO2 94%   General: Well Developed, Well Nourished, Age appropriate appearance  HEENT: Normocephalic, atraumatic  Psych: Normal mood and affect  Neck: Supple, nontender, no masses  Lungs: Breathing unlabored, No audible wheezing  Heart: Regular heart rate and rhythm  Abdomen: Soft, NT, ND  Neuro: Sensation grossly intact to BUE and BLE, moving all four extremities  Skin: Intact, no open wounds  Vascular: 2+DP/PT, Capillary refill <2 seconds  MSK: Left hip pain and deformity      Radiographs:  Displaced left femoral neck fracture  No orders to display       Laboratory Values  Recent Labs     09/28/24  0219   WBC 6.1   RBC 4.02*   HEMOGLOBIN 12.8   HEMATOCRIT 37.5   MCV 93.3   MCH 31.8   MCHC 34.1   RDW 42.1   PLATELETCT 336   MPV 9.7     Recent Labs     09/28/24  0219   SODIUM 141   POTASSIUM 3.8   CHLORIDE 106   CO2 22   GLUCOSE 106*   BUN 10             Impression:Left femoral neck fracture    Plan:We discussed the diagnosis and findings with the patient at length.  We reviewed possible non operative and operative interventions and the risks and benefits of each of these.  she had a chance to ask questions and all of these were answered to her satisfaction. The patient chose to proceed with  hemiarthroplasty including all indicated procedures. Risks and benefits of surgery were discussed which include but are not limited to pain,bleeding, infection, neurovascular damage, instability, leg length discrepancy, need for additional surgery, DVT, PE, MI, Stroke and death. They understand these risks and wish to proceed.    Surgical treatment of infection is urgent as delay in intervention can result in worsening infection, subsequent amputation or more proximal amputation, sepsis, multisystem organ failure and death.    Surgical treatment of hip fractures is urgent as delay in intervention can increase the risk of neurovascular injury, progressive soft tissue " injury, compartment syndrome, infection, malunion, nonunion, loss of motion, DVT and death.

## 2024-09-28 NOTE — OP REPORT
DATE OF OPERATION: 9/28/2024     PREOPERATIVE DIAGNOSIS: Left displaced femoral neck fracture    POSTOPERATIVE DIAGNOSIS: Same    PROCEDURE PERFORMED: Open treatment of left femoral fracture, proximal end, neck with prosthetic replacement    SURGEON: David Schroeder M.D.     ASSISTANT: None    ANESTHESIA: General    ESTIMATED BLOOD LOSS: 50 mL    INDICATIONS: The patient is a 73 y.o. female with a left femoral neck fracture resulting from a ground level fall.  The patient denies antecedent pain, and was found to have a normal neurovascular exam and skin envelope.  Radiographs reviewed by myself demonstrated a displaced femoral neck fracture.  Given these findings, the patient is a candidate for surgical treatment of the femoral neck fracture with hemiarthroplasty.  I discussed the risks and benefits of the procedure, including the risks of infection, wound healing complication, neurovascular injury, instability, limb length discrepancy, need for revision surgery, and the medical risks of anesthesia including DVT, PE, MI, stroke, and death.  Benefits include early mobilization and reduction in the medical risks of hip fractures.  Alternatives to surgery were also discussed, including non-operative management, percutaneous screw fixation and total hip arthroplasty.  The patient was in agreement with the plan to proceed, the informed consent was signed and documented and the operative extremity was marked.      PROCEDURE:  The patient was sedated with general anesthesia, and positioned in the lateral decubitus position on a beanbag with all bony prominences well padded and an axillary roll placed.  Perioperative antibiotics were administered. Sequential compression devices were employed.  The correct operative site was prepped and draped into a sterile field.  A procedural pause was conducted to verify correct patient, correct extremity, and presence of the surgeons initials on the operative extremity.    A  posterior approach to the hip was performed with care taken to avoid all neurovascular structures.  The fascia was split in line with the incision to the tip of the greater troch, then curved posteriorly to parallel the gluteal fibers.  The short external rotators and capsule were taken down en bloc and tagged with No 5 Ticron suture for future repair. The labrum was preserved and the sciatic nerve was protected at all times.    A femoral neck cut was made one finger breadth above the lesser trochanter and the femoral head was removed without difficulty.  The acetabulum was inspected and cleared of foreign material.  A femoral neck retractor was placed, and the canal was sequentially reamed and broached to a size 6.  After preparation of the canal, a cement restrictor was placed. A Efficiency Exchange Ion size 6 stem was cemented in the appropriate version using third generation cement techniques.    Once cement had dried completely a 47mm shell and 26+0mm head were impacted. The hip was then reduced and stability was tested. Hip was stable in full extension and external rotation and stable to 90 degrees internal rotation at 90 degrees forward flexion. Wounds were irrigated and capsule was closed to greater trochanter with #5 Ticron sutures.  The wound was then closed in layers, with #1 Vicryl in the fascia, 2-0 vicryl in the subcutaneous tissue, and staples in the skin.  Sterile dressings were applied, and the patient was transferred to PACU in stable condition.    The patient tolerated the procedure well. There were no apparent complications. All sponge, needle, and instrument counts were correct on two separate occasions. She was awakened, extubated, and transferred to the recovery room in satisfactory condition.         Post-Operative Plan:    1.  The patient should bear weight as tolerated on their operative extremity with posterior hip precautions.  Gait aids (crutch or crutches, cane, walker) may be used as needed, and  may be discontinued when no longer required.  2.  IV antibiotics - may be continued for 24 hours  3.  DVT prophylaxis - SCD's and Lovenox 40 mg SQ daily while inpatient.  The patient may transition to Aspirin 325 mg PO BID as an outpatient  4.  Discharge planning  ____________________________________   David Schroeder M.D.   DD: 9/28/2024  8:32 AM

## 2024-09-28 NOTE — CARE PLAN
The patient is Stable - Low risk of patient condition declining or worsening    Shift Goals  Clinical Goals: pain control, NPO, comfort, rest  Patient Goals: pain control, rest  Family Goals: not present    Progress made toward(s) clinical / shift goals:  yes      Problem: Knowledge Deficit - Standard  Goal: Patient and family/care givers will demonstrate understanding of plan of care, disease process/condition, diagnostic tests and medications  Description: Target End Date:  1-3 days or as soon as patient condition allows    Document in Patient Education    1.  Patient and family/caregiver oriented to unit, equipment, visitation policy and means for communicating concern  2.  Complete/review Learning Assessment  3.  Assess knowledge level of disease process/condition, treatment plan, diagnostic tests and medications  4.  Explain disease process/condition, treatment plan, diagnostic tests and medications  Outcome: Progressing     Problem: Pain - Standard  Goal: Alleviation of pain or a reduction in pain to the patient’s comfort goal  Description: Target End Date:  Prior to discharge or change in level of care    Document on Vitals flowsheet    1.  Document pain using the appropriate pain scale per order or unit policy  2.  Educate and implement non-pharmacologic comfort measures (i.e. relaxation, distraction, massage, cold/heat therapy, etc.)  3.  Pain management medications as ordered  4.  Reassess pain after pain med administration per policy  5.  If opiods administered assess patient's response to pain medication is appropriate per POSS sedation scale  6.  Follow pain management plan developed in collaboration with patient and interdisciplinary team (including palliative care or pain specialists if applicable)  Outcome: Progressing     Problem: Skin Integrity  Goal: Skin integrity is maintained or improved  Description: Target End Date:  Prior to discharge or change in level of care    Document interventions on  Skin Risk/Caio flowsheet groups and corresponding LDA    1.  Assess and monitor skin integrity, appearance and/or temperature  2.  Assess risk factors for impaired skin integrity and/or pressures ulcers  3.  Implement precautions to protect skin integrity in collaboration with interdisciplinary team  4.  Implement pressure ulcer prevention protocol if at risk for skin breakdown  5.  Confirm wound care consult if at risk for skin breakdown  6.  Ensure patient use of pressure relieving devices  (Low air loss bed, waffle overlay, heel protectors, ROHO cushion, etc)  Outcome: Progressing

## 2024-09-28 NOTE — PROGRESS NOTES
1700 Report received from Connie GOLDSTEIN. [facility] Pt transferred from [facility] to hospital bed with slideboard assist. Pt AAOx4, VSS on room air. Pt oriented to room and to call light. Discussed plan of care. All needs met at this time. Bed locked and in lowest position.

## 2024-09-28 NOTE — DISCHARGE PLANNING
[facility] Transitional Care Coordination    Referral from: Dr. Schroeder    Insurance Provider on Facesheet: [facility]    Potential Rehab Diagnosis: Left displaced femoral neck fracture     Chart review indicates patient may have on going medical management and may have therapy needs to possibly meet inpatient rehab facility criteria with the goal of returning to community.    D/C support will need to be verified: Brother    Physiatry consultation pended per protocol.  Following for [facility].      Thank you for the referral.

## 2024-09-28 NOTE — ASSESSMENT & PLAN NOTE
-Inpatient status: Medical Floor  -NPO at 12 am: OK to have a light meal before 12 am.   -[facility]: Acute subcapital femoral neck fracture. Images were uploaded.  -I appreciate Orthopedic Consult and recommendations. I personally discussed this case with Dr. Schroeder. Plan for OR in am  -Pain control with IV narcotics

## 2024-09-28 NOTE — ANESTHESIA TIME REPORT
Anesthesia Start and Stop Event Times       Date Time Event    9/28/2024 0735 Ready for Procedure     0738 Anesthesia Start     0842 Anesthesia Stop          Responsible Staff  09/28/24      Name Role Begin End    Jeffy De León M.D. Anesth 0738 0842          Overtime Reason:  no overtime (within assigned shift)    Comments:

## 2024-09-28 NOTE — PROGRESS NOTES
[id number] Report received from Richard GOLDSTEIN.    0397 Pt arrived to unit from PACU via hospital bed. Pt AAOx4, VSS on 10L oxymask per ERAS protocol. Assessment completed. Surgical dressing to left hip CDI. Pt oriented to room and to call light. Discussed plan of care. All needs met at this time. Bed locked and in lowest position. Bed alarm on.

## 2024-09-28 NOTE — ANESTHESIA PREPROCEDURE EVALUATION
Case: [id number] Date/Time: 09/28/24 0715    Procedure: HEMIARTHROPLASTY, HIP (Left)    Location: [place] [address] / SURGERY [facility]    Surgeons: David Schroeder M.D.            Relevant Problems   No relevant active problems       Physical Exam    Airway   Mallampati: II  TM distance: >3 FB  Neck ROM: full       Cardiovascular - normal exam  Rhythm: regular  Rate: normal  (-) murmur     Dental - normal exam           Pulmonary - normal exam  Breath sounds clear to auscultation     Abdominal    Neurological - normal exam                   Anesthesia Plan    ASA 1       Plan - general       Airway plan will be ETT        Plan Factors:   Patient was previously instructed to abstain from smoking on day of procedure.  Patient did not smoke on day of procedure.      Induction: intravenous    Postoperative Plan: Postoperative administration of opioids is intended.    Pertinent diagnostic labs and testing reviewed    Informed Consent:    Anesthetic plan and risks discussed with patient.    Use of blood products discussed with: patient whom consented to blood products.

## 2024-09-28 NOTE — PROGRESS NOTES
Virtual Nurse portion of admission profile and rounding complete.  Rounding Needs: Patient resting comfortably with no needs at this time.

## 2024-09-29 PROBLEM — Z71.89 ADVANCE CARE PLANNING: Status: ACTIVE | Noted: 2024-09-29

## 2024-09-29 LAB
ANION GAP SERPL CALC-SCNC: 9 MMOL/L (ref 7–16)
BUN SERPL-MCNC: 13 MG/DL (ref 8–22)
CALCIUM SERPL-MCNC: 8.8 MG/DL (ref 8.5–10.5)
CHLORIDE SERPL-SCNC: 101 MMOL/L (ref 96–112)
CO2 SERPL-SCNC: 24 MMOL/L (ref 20–33)
CREAT SERPL-MCNC: 0.74 MG/DL (ref 0.5–1.4)
ERYTHROCYTE [DISTWIDTH] IN BLOOD BY AUTOMATED COUNT: 41.1 FL (ref 35.9–50)
GFR SERPLBLD CREATININE-BSD FMLA CKD-EPI: 85 ML/MIN/1.73 M 2
GLUCOSE SERPL-MCNC: 119 MG/DL (ref 65–99)
HCT VFR BLD AUTO: 27.4 % (ref 37–47)
HGB BLD-MCNC: 9.7 G/DL (ref 12–16)
MAGNESIUM SERPL-MCNC: 1.9 MG/DL (ref 1.5–2.5)
MCH RBC QN AUTO: 32.7 PG (ref 27–33)
MCHC RBC AUTO-ENTMCNC: 35.4 G/DL (ref 32.2–35.5)
MCV RBC AUTO: 92.3 FL (ref 81.4–97.8)
PHOSPHATE SERPL-MCNC: 3 MG/DL (ref 2.5–4.5)
PLATELET # BLD AUTO: 262 K/UL (ref 164–446)
PMV BLD AUTO: 9.5 FL (ref 9–12.9)
POTASSIUM SERPL-SCNC: 3.9 MMOL/L (ref 3.6–5.5)
RBC # BLD AUTO: 2.97 M/UL (ref 4.2–5.4)
SODIUM SERPL-SCNC: 134 MMOL/L (ref 135–145)
WBC # BLD AUTO: 5.6 K/UL (ref 4.8–10.8)

## 2024-09-29 PROCEDURE — 97535 SELF CARE MNGMENT TRAINING: CPT

## 2024-09-29 PROCEDURE — 85027 COMPLETE CBC AUTOMATED: CPT

## 2024-09-29 PROCEDURE — A9270 NON-COVERED ITEM OR SERVICE: HCPCS | Performed by: ORTHOPAEDIC SURGERY

## 2024-09-29 PROCEDURE — 770001 HCHG ROOM/CARE - MED/SURG/GYN PRIV*

## 2024-09-29 PROCEDURE — 99497 ADVNCD CARE PLAN 30 MIN: CPT | Performed by: STUDENT IN AN ORGANIZED HEALTH CARE EDUCATION/TRAINING PROGRAM

## 2024-09-29 PROCEDURE — 83735 ASSAY OF MAGNESIUM: CPT

## 2024-09-29 PROCEDURE — 700102 HCHG RX REV CODE 250 W/ 637 OVERRIDE(OP): Performed by: ORTHOPAEDIC SURGERY

## 2024-09-29 PROCEDURE — 99233 SBSQ HOSP IP/OBS HIGH 50: CPT | Mod: 25 | Performed by: STUDENT IN AN ORGANIZED HEALTH CARE EDUCATION/TRAINING PROGRAM

## 2024-09-29 PROCEDURE — 99223 1ST HOSP IP/OBS HIGH 75: CPT | Performed by: PHYSICAL MEDICINE & REHABILITATION

## 2024-09-29 PROCEDURE — 36415 COLL VENOUS BLD VENIPUNCTURE: CPT

## 2024-09-29 PROCEDURE — 84100 ASSAY OF PHOSPHORUS: CPT

## 2024-09-29 PROCEDURE — 700111 HCHG RX REV CODE 636 W/ 250 OVERRIDE (IP): Mod: JZ | Performed by: ORTHOPAEDIC SURGERY

## 2024-09-29 PROCEDURE — 97166 OT EVAL MOD COMPLEX 45 MIN: CPT

## 2024-09-29 PROCEDURE — 80048 BASIC METABOLIC PNL TOTAL CA: CPT

## 2024-09-29 PROCEDURE — 97162 PT EVAL MOD COMPLEX 30 MIN: CPT

## 2024-09-29 RX ADMIN — OXYCODONE 5 MG: 5 TABLET ORAL at 08:35

## 2024-09-29 RX ADMIN — ENOXAPARIN SODIUM 40 MG: 100 INJECTION SUBCUTANEOUS at 19:26

## 2024-09-29 RX ADMIN — OXYCODONE HYDROCHLORIDE 10 MG: 10 TABLET ORAL at 04:30

## 2024-09-29 RX ADMIN — DOCUSATE SODIUM 100 MG: 100 CAPSULE, LIQUID FILLED ORAL at 16:14

## 2024-09-29 RX ADMIN — POLYETHYLENE GLYCOL 3350 1 PACKET: 17 POWDER, FOR SOLUTION ORAL at 04:53

## 2024-09-29 RX ADMIN — HYDROMORPHONE HYDROCHLORIDE 0.5 MG: 1 INJECTION, SOLUTION INTRAMUSCULAR; INTRAVENOUS; SUBCUTANEOUS at 20:48

## 2024-09-29 RX ADMIN — HYDROMORPHONE HYDROCHLORIDE 0.5 MG: 1 INJECTION, SOLUTION INTRAMUSCULAR; INTRAVENOUS; SUBCUTANEOUS at 15:15

## 2024-09-29 RX ADMIN — OXYCODONE HYDROCHLORIDE 10 MG: 10 TABLET ORAL at 19:10

## 2024-09-29 RX ADMIN — OXYCODONE 5 MG: 5 TABLET ORAL at 13:32

## 2024-09-29 RX ADMIN — KETOROLAC TROMETHAMINE 15 MG: 15 INJECTION, SOLUTION INTRAMUSCULAR; INTRAVENOUS at 04:53

## 2024-09-29 RX ADMIN — DOCUSATE SODIUM 100 MG: 100 CAPSULE, LIQUID FILLED ORAL at 04:53

## 2024-09-29 RX ADMIN — SENNOSIDES AND DOCUSATE SODIUM 1 TABLET: 50; 8.6 TABLET ORAL at 19:25

## 2024-09-29 ASSESSMENT — ACTIVITIES OF DAILY LIVING (ADL): TOILETING: INDEPENDENT

## 2024-09-29 ASSESSMENT — COGNITIVE AND FUNCTIONAL STATUS - GENERAL
SUGGESTED CMS G CODE MODIFIER DAILY ACTIVITY: CK
HELP NEEDED FOR BATHING: A LOT
DRESSING REGULAR LOWER BODY CLOTHING: A LOT
DAILY ACTIVITIY SCORE: 19
TOILETING: A LITTLE
MOVING TO AND FROM BED TO CHAIR: A LITTLE
STANDING UP FROM CHAIR USING ARMS: A LITTLE
CLIMB 3 TO 5 STEPS WITH RAILING: A LOT
SUGGESTED CMS G CODE MODIFIER MOBILITY: CK
MOBILITY SCORE: 16
WALKING IN HOSPITAL ROOM: A LITTLE
MOVING FROM LYING ON BACK TO SITTING ON SIDE OF FLAT BED: A LOT
TURNING FROM BACK TO SIDE WHILE IN FLAT BAD: A LITTLE

## 2024-09-29 ASSESSMENT — ENCOUNTER SYMPTOMS: FALLS: 1

## 2024-09-29 ASSESSMENT — GAIT ASSESSMENTS: GAIT LEVEL OF ASSIST: UNABLE TO PARTICIPATE

## 2024-09-29 ASSESSMENT — PAIN DESCRIPTION - PAIN TYPE: TYPE: SURGICAL PAIN

## 2024-09-29 ASSESSMENT — PATIENT HEALTH QUESTIONNAIRE - PHQ9
2. FEELING DOWN, DEPRESSED, IRRITABLE, OR HOPELESS: NOT AT ALL
SUM OF ALL RESPONSES TO PHQ9 QUESTIONS 1 AND 2: 0

## 2024-09-29 NOTE — PROGRESS NOTES
4 Eyes Skin Assessment Completed by TRISTON Alfredo and TRISTON Pacheco.    Head WDL  Ears WDL  Nose WDL  Mouth WDL  Neck WDL  Breast/Chest WDL  Shoulder Blades WDL  Spine WDL  (R) Arm/Elbow/Hand Redness and Blanching  (L) Arm/Elbow/Hand Redness and Blanching  Abdomen WDL  Groin WDL  Scrotum/Coccyx/Buttocks Redness and Blanching  (R) Leg WDL  (L) Leg Surgical Site with dressing in placed.  (R) Heel/Foot/Toe Redness and Blanching  (L) Heel/Foot/Toe Redness and Blanching          Devices In Places Blood Pressure Cuff, Pulse Ox, and SCD's      Interventions In Place Pillows    Possible Skin Injury No    Pictures Uploaded Into Epic N/A  Wound Consult Placed N/A  RN Wound Prevention Protocol Ordered No

## 2024-09-29 NOTE — CARE PLAN
The patient is Stable - Low risk of patient condition declining or worsening    Shift Goals  Clinical Goals: pain control, safety and comfort, mobilization  Patient Goals: pain control  Family Goals: n/a    Progress made toward(s) clinical / shift goals:    Problem: Knowledge Deficit - Standard  Goal: Patient and family/care givers will demonstrate understanding of plan of care, disease process/condition, diagnostic tests and medications  Outcome: Progressing     Problem: Pain - Standard  Goal: Alleviation of pain or a reduction in pain to the patient’s comfort goal  Outcome: Progressing     Problem: Skin Integrity  Goal: Skin integrity is maintained or improved  Outcome: Progressing     Problem: Fall Risk  Goal: Patient will remain free from falls  Outcome: Progressing     Patient is alert and oriented, on RA.   Fall protocol in effect. Bed in lowest position. Brakes and bed alarm on.   Maintained a clutter free environment. Skid socks on. Call light and personal belongings within reach. SCD's on.   Patient c/o of pain, treated per MAR. Educated on pain scale.   Patient educated on POC. Encouraged verbalize of feelings.   All questions were answered.    Patient is not progressing towards the following goals:

## 2024-09-29 NOTE — CONSULTS
Physical Medicine and Rehabilitation Consultation              Date of initial consultation: 9/29/2024  Requesting provider: ordered by David Schroeder M.D. at 09/30/24 0710   Consulting provider: Lydia Ashby D.O.  Reason for consultation: assess for acute inpatient rehab appropriateness  LOS: 2 Day(s)    Chief complaint: left hip pain after GLF     HPI: The patient is a 73 y.o.  female with no known pmhx;  who presented on 9/27/2024  5:08 PM  [facility] a GLF on 9/13. Per documentation, patient sustained a MGLF landing on her left side. [facility] obtained were negative for acute fracture,patint was then discharged home with SPC. Patient's pain continued to worsen over the next 7-10 days and [facility] pelvis was obtained that showed a left subcapital fracture and patient was transferred to [facility] for ortho evaluation.   Upon eval at [facility], ortho was consulted and patient was taken to the OR on 9/28 for open treatment of the left femoral neck fracture performed by Dr. Schroeder. Patient is WBAT LLE with posterior hip precautions. Patient's hospital course has been notable for hypotension and ABLA.     Patient seen and examined at bedside with brother and friend in room. Patient reports she feels ok, pain is controlled at rest. Reports feeling a little lightheaded when up with therapy. Does not report HA,  SOB, CP, abdominal pain, or changes in numbness/tingling/weakness.       Social Hx:  Patient lives alone in a 1 story house with 2 CHARLEY. Has support from friends and brother who may be able to help intermittently    2 CHARLEY  At prior level of function patient was Independent with mobility and ADLs. MDO I with SPC     Tobacco: Denied  Alcohol: Denied  Drugs: Denied     THERAPY:  Restrictions: Fall Risk, posterior hip precautions, WBAT LLE   PT: Functional mobility   9/29  Min A sit to stand, Min A  transfers, unable to tolerate gait due to hypotension     OT: ADLs  9/29 Min A transfers, Min A sit to stand, Mod A lower body dressing     SLP:   None     IMAGING:  DX-WRIST-COMPLETE 3+ LEFT  Narrative: HISTORY/REASON FOR EXAM:  Pain/Deformity Following Trauma.    TECHNIQUE/EXAM DESCRIPTION AND NUMBER OF VIEWS: LEFT wrist, 3 views, 2/6/2018 1:37 PM.    COMPARISON: None.    FINDINGS:  There is no evidence of fracture or dislocation.    Joint Spaces: Normal.    Benign subchondral cyst within the mid body of the scaphoid    Subchondral cyst involving the lunate    There is degenerative changes of the articulation with the lunate and triquetrum.    Small bony excrescence from the distal shaft of the fifth metacarpal measuring approximately 2 mm.  Impression: Degenerative changes. No fracture.        PROCEDURES:  9/28 Open treatment of left femoral fracture, proximal end, neck with prosthetic replacement by Dr. Schroeder     PMH:  Past Medical History:   Diagnosis Date    No known health problems        PSH:  Past Surgical History:   Procedure Laterality Date    ABDOMINAL HYSTERECTOMY TOTAL      APPENDECTOMY         FHX:  History reviewed. No pertinent family history.    Medications:  Current Facility-Administered Medications   Medication Dose    enoxaparin (Lovenox) inj 40 mg  40 mg    Pharmacy Consult Request ...Pain Management Review 1 Each  1 Each    ondansetron (Zofran) syringe/vial injection 4 mg  4 mg    dexamethasone (Decadron) injection 4 mg  4 mg    diphenhydrAMINE (Benadryl) injection 25 mg  25 mg    haloperidol lactate (Haldol) injection 1 mg  1 mg    scopolamine (Transderm-Scop) patch 1 Patch  1 Patch    docusate sodium (Colace) capsule 100 mg  100 mg    senna-docusate (Pericolace Or Senokot S) 8.6-50 MG per tablet 1 Tablet  1 Tablet    senna-docusate (Pericolace Or Senokot S) 8.6-50 MG per tablet 1 Tablet  1 Tablet    polyethylene glycol/lytes (Miralax) Packet 1 Packet  1 Packet    magnesium hydroxide  "(Milk Of Magnesia) suspension 30 mL  30 mL    bisacodyl (Dulcolax) suppository 10 mg  10 mg    sodium phosphate enema 1 Each  1 Each    acetaminophen (Tylenol) tablet 650 mg  650 mg    Followed by    [START ON 10/3/2024] acetaminophen (Tylenol) tablet 650 mg  650 mg    oxyCODONE immediate-release (Roxicodone) tablet 5 mg  5 mg    Or    oxyCODONE immediate release (Roxicodone) tablet 10 mg  10 mg    Or    HYDROmorphone (Dilaudid) injection 0.5 mg  0.5 mg    ondansetron (Zofran ODT) dispertab 4 mg  4 mg    NS infusion         Allergies:  Allergies   Allergen Reactions    Morphine Rash     Per pt all over chest    Codeine     Penicillins          Physical Exam:  Vitals: /65   Pulse 74   Temp 37 °C (98.6 °F) (Temporal)   Resp 16   Ht 1.727 m (5' 8\")   Wt 68.1 kg (150 lb 2.1 oz)   SpO2 98%   Gen: NAD, laying comfortably in bed, friends in room   Head:  NC/AT  Eyes/ Nose/ Mouth: PERRLA, moist mucous membranes  Cardio: RRR, good distal perfusion, warm extremities  Pulm: normal respiratory effort, no cyanosis, on RA   Abd: Soft NTND  Ext: No peripheral edema. No calf tenderness. No clubbing.    Mental status:  A&Ox4 (person, place, date, situation) answers questions appropriately follows commands  Speech: fluent, no aphasia or dysarthria    CRANIAL NERVES:  2,3: visual acuity grossly intact, PERRL  3,4,6: EOMI bilaterally, no nystagmus or diplopia  5: sensation intact to light touch bilaterally and symmetric  7: no facial asymmetry  8: hearing grossly intact    Motor:      Upper Extremity  Myotome R L   Shoulder flexion C5 5/5 5/5   Elbow flexion C5 5/5 5/5   Wrist extension C6 5/5 5/5   Elbow extension C7 5/5 5/5   Finger flexion C8 5/5 5/5   Finger abduction T1 5/5 5/5     Lower Extremity Myotome R L   Hip flexion L2 5/5 2/5   Knee extension L3 5/5 2/5   Ankle dorsiflexion L4 5/5 4/5   Toe extension L5 5/5 5/5   Ankle plantarflexion S1 5/5 5/5       Sensory:   intact to light touch through out  DTRs: 2+ in " bilateral  biceps  No clonus at bilateral ankles  Negative Cortez b/l     Tone: no spasticity noted    Coordination:   intact finger to nose bilaterally  intact fine motor with fingers bilaterally      Labs: Reviewed and significant for   Recent Labs     09/28/24 0219 09/29/24 0906   RBC 4.02* 2.97*   HEMOGLOBIN 12.8 9.7*   HEMATOCRIT 37.5 27.4*   PLATELETCT 336 262     Recent Labs     09/28/24 0219 09/29/24 0906   SODIUM 141 134*   POTASSIUM 3.8 3.9   CHLORIDE 106 101   CO2 22 24   GLUCOSE 106* 119*   BUN 10 13   CREATININE 0.68 0.74   CALCIUM 9.2 8.8     Recent Results (from the past 24 hour(s))   CBC WITHOUT DIFFERENTIAL    Collection Time: 09/29/24  9:06 AM   Result Value Ref Range    WBC 5.6 4.8 - 10.8 K/uL    RBC 2.97 (L) 4.20 - 5.40 M/uL    Hemoglobin 9.7 (L) 12.0 - 16.0 g/dL    Hematocrit 27.4 (L) 37.0 - 47.0 %    MCV 92.3 81.4 - 97.8 fL    MCH 32.7 27.0 - 33.0 pg    MCHC 35.4 32.2 - 35.5 g/dL    RDW 41.1 35.9 - 50.0 fL    Platelet Count 262 164 - 446 K/uL    MPV 9.5 9.0 - 12.9 fL   Basic Metabolic Panel    Collection Time: 09/29/24  9:06 AM   Result Value Ref Range    Sodium 134 (L) 135 - 145 mmol/L    Potassium 3.9 3.6 - 5.5 mmol/L    Chloride 101 96 - 112 mmol/L    Co2 24 20 - 33 mmol/L    Glucose 119 (H) 65 - 99 mg/dL    Bun 13 8 - 22 mg/dL    Creatinine 0.74 0.50 - 1.40 mg/dL    Calcium 8.8 8.5 - 10.5 mg/dL    Anion Gap 9.0 7.0 - 16.0   PHOSPHORUS    Collection Time: 09/29/24  9:06 AM   Result Value Ref Range    Phosphorus 3.0 2.5 - 4.5 mg/dL   MAGNESIUM    Collection Time: 09/29/24  9:06 AM   Result Value Ref Range    Magnesium 1.9 1.5 - 2.5 mg/dL   ESTIMATED GFR    Collection Time: 09/29/24  9:06 AM   Result Value Ref Range    GFR (CKD-EPI) 85 >60 mL/min/1.73 m 2         ASSESSMENT:  Patient is a 73 y.o. female admitted with left hip fracture     [facility] Code / Diagnosis to Support: 0008.11 - Orthopaedic Disorders: Status Post Unilateral Hip Fracture  See DISPO details below for recommendations on  appropriate level of rehab for this diagnosis.    Barriers to transfer include: Insurance authorization, TCCs to verify disposition, medical clearance and bed availability     Assessment and Plan:  Left hip fracture   - sustained in GLF on 9/13   - [facility]   - transferred to [facility], ortho consulted   - 9/28 ORIF of the left femoral neck fracture with prosthetic replacement by Dr. Schroeder   - WBAT LLE , posterior hip precautions   - continue with PT/OT     ABLA   -post op drop in Hgb   - 9/29 Hgb 9.7   - primary team monitoring CBC     Hypotension  - was unable to tolerate gait due to hypotension   - not currently on antihypertensives   - GRACIELA hose ordered for when OOB     Hyponatremia   - post op drop in Na   - 9/29 Na 134  - primary team monitoring Cr       DISPO:  - patient is currently functioning below their level of baseline, recommend post acute rehab  - recommend IRF level therapy with 3hr of therapy 5 days per week   - prior to acceptance to IRF, will need insurance auth from [facility] (unless  medicare is primary?)   - TCC to assist with insurance auth and confirmation of  DC support from friends        Medical Complexity:  Left hip fracture   ABLA   Hyponatremia   Impaired mobility and ADLs       DVT PPX: Lovenox       Thank you for allowing us to participate in the care of this patient.     Patient was seen for >80 minutes on unit/floor of which > 50% of time was spent on counseling and coordination of care regarding the above, including prognosis, risk reduction, benefits of treatment, and options for next stage of care.    Lydia Ashby D.O.   Physical Medicine and Rehabilitation     Please note that this dictation was created using voice recognition software. I have made every reasonable attempt to correct obvious errors, but there may be errors of grammar and possibly content that I did not discover before finalizing the note.

## 2024-09-29 NOTE — PROGRESS NOTES
"    Orthopedic PA Progress Note    Interval changes:  Patient doing well postop  LLE dressings are CDI  WBAT LLE  No pending ortho procedures  Follow up with the [facility] trauma LISA clinic 2 weeks postop.  Cleared for DC from orthopedic standpoint pending therapy recs and medical optimization    ROS - Patient denies any new issues.  Denies any numbness or tingling. Pain well controlled.    /65   Pulse 74   Temp 37 °C (98.6 °F) (Temporal)   Resp 16   Ht 1.727 m (5' 8\")   Wt 68.1 kg (150 lb 2.1 oz)   SpO2 98%     Patient seen and examined  No acute distress  Breathing non labored  RRR  LLE: Surgical dressing is clean, dry, and intact. Patient clearly fires tibialis anterior, EHL, and gastrocnemius/soleus. Sensation is intact to light touch throughout superficial peroneal, deep peroneal, tibial, saphenous, and sural nerve distributions. Strong and palpable 2+ dorsalis pedis and posterior tibial pulses with capillary refill less than 2 seconds.   Recent Labs     09/28/24  0219   WBC 6.1   RBC 4.02*   HEMOGLOBIN 12.8   HEMATOCRIT 37.5   MCV 93.3   MCH 31.8   MCHC 34.1   RDW 42.1   PLATELETCT 336   MPV 9.7       Active Hospital Problems    Diagnosis     Closed left hip fracture, initial encounter ([facility]) [S72.002A]     Preop cardiovascular exam [Z01.810]     Hyponatremia [E87.1]     Closed displaced fracture of left femoral neck ([facility]) [S72.002A]        No orders to display       Assessment/Plan:  Patient doing well postop  LLE dressings are CDI  WBAT LLE  No pending ortho procedures  Follow up with the [facility] trauma LISA clinic 2 weeks postop.  Cleared for DC from orthopedic standpoint pending therapy recs and medical optimization    POD#1 S/p  Open treatment of left femoral fracture, proximal end, neck with prosthetic replacement   Wt bearing status - WBAT LLE with posterior hip precautions  Wound care/Drains - Dressings to be changed every other day by nursing. Or PRN for saturation starting POD#2  Future Procedures - " None planned   Lovenox: Start 9/28, Duration-until ambulatory > 150'  Sutures/Staples out- 14-21 days post operatively. Removal will completed by ortho LISA's unless transferred.  DVT Prophylaxis outpatient: ASA 81 mg PO BID x4 weeks  PT/OT-initiated  Antibiotics:  Perioperative completed  DVT Prophylaxis- TEDS/SCDs/Foot pumps  Alvarado-not needed per ortho  Case Coordination for Discharge Planning - Disposition per therapy recs.

## 2024-09-29 NOTE — THERAPY
"Occupational Therapy   Initial Evaluation     Patient Name: Ashley Gillespie  Age:  73 y.o., Sex:  female  Medical Record #: [id number]  Today's Date: 9/29/2024     Precautions  Precautions: Fall Risk, Posterior Hip Precautions, Weight Bearing As Tolerated Left Lower Extremity    Assessment  Patient is 73 y.o. female with Left femoral neck fracture resulting from GLF on 9/13. Now s/p LLE hemiarthroplasty.   Pt seen for OT eval. Pt educ on posterior hip prec and handout provided. Pt required Elly for bringing LLE to EOB, Elly for funcitonal mobility with FWW on/off toilet, SBA for toileting. Pt educ on reacher and sock aide use, required modA for LB dressing. Pt will continue to benefit from inpt OT. Pt has good support from friend whom she can stay with upon dc. Pt is very motivated to return to prior independence, will recommend post acute placement and PM&R consult.     Plan    Occupational Therapy Initial Treatment Plan   Treatment Interventions: (P) Self Care / Activities of Daily Living, Adaptive Equipment, Therapeutic Exercises, Therapeutic Activity  Treatment Frequency: (P) 4 Times per Week  Duration: (P) Until Therapy Goals Met    DC Equipment Recommendations: (P) Tub / Shower Seat  Discharge Recommendations: (P) Recommend post-acute placement for additional occupational therapy services prior to discharge home     Subjective    \" My friend is a nurse\"      Objective       09/29/24 1007   Prior Living Situation   Prior Services Home-Independent   Housing / Facility 1 Story House   Steps Into Home 2   Steps In Home 0   Bathroom Set up Walk In Shower   Equipment Owned Single Point Cane   Lives with - Patient's Self Care Capacity Alone and Able to Care For Self   Comments Pt lives alone, reports she can stay with her friend who is a nurse. Her friends home is a single story home, walk in shower.   Prior Level of ADL Function   Self Feeding Independent   Grooming / Hygiene Independent   Bathing " Independent   Dressing Independent   Toileting Independent   Prior Level of IADL Function   Medication Management Independent   Laundry Independent   Kitchen Mobility Independent   Finances Independent   Home Management Independent   Shopping Independent   Prior Level Of Mobility Independent Without Device in Community   Driving / Transportation Driving Independent   History of Falls   History of Falls Yes   Date of Last Fall   (reason for admit, tripped letting dog out)   Precautions   Precautions Fall Risk;Posterior Hip Precautions;Weight Bearing As Tolerated Left Lower Extremity   Vitals   Pulse Oximetry 98 %   O2 Delivery Device None - Room Air   Vitals Comments no c/o dizziness throughout   Pain   Intervention Repositioned   Pain 0 - 10 Group   Location Hip   Location Orientation Left   Pain Rating Scale (NPRS) 7   Therapist Pain Assessment During Activity;Post Activity Pain Same as Prior to Activity;Nurse Notified;7   Non Verbal Descriptors   Non Verbal Scale  Calm   Cognition    Cognition / Consciousness WDL   Level of Consciousness Alert   Comments pleasant and cooperative throughout, able to make needs known and follows all commands   Passive ROM Upper Body   Passive ROM Upper Body WDL   Active ROM Upper Body   Active ROM Upper Body  WDL   Dominant Hand Right   Strength Upper Body   Upper Body Strength  WDL   Sensation Upper Body   Upper Extremity Sensation  WDL   Upper Body Muscle Tone   Upper Body Muscle Tone  WDL   Neurological Concerns   Neurological Concerns No   Coordination Upper Body   Coordination WDL   Balance Assessment   Sitting Balance (Static) Fair   Sitting Balance (Dynamic) Poor +   Standing Balance (Static) Fair -   Standing Balance (Dynamic) Poor +   Weight Shift Sitting Fair   Weight Shift Standing Poor   Comments w/FWW   Bed Mobility    Supine to Sit Minimal Assist   Scooting Minimal Assist   Rolling Minimum Assist to Lt.   Comments HOB slightly elevated   ADL Assessment   Eating  Independent   Grooming Supervision;Seated   Upper Body Dressing Supervision   Lower Body Dressing Moderate Assist  (reacher to don shorts)   Toileting Standby Assist   Functional Mobility   Sit to Stand Minimal Assist   Bed, Chair, Wheelchair Transfer Minimal Assist   Toilet Transfers Minimal Assist   Transfer Method Stand Step   Mobility supine>sit EOB> toilet> chair   Comments w/FWW   Visual Perception   Visual Perception  WDL   Edema / Skin Assessment   Edema / Skin  X   Comments L hip incision w/ dressing   Activity Tolerance   Sitting in Chair post session   Sitting Edge of Bed 5 min   Standing 7 min total   Patient / Family Goals   Patient / Family Goal #1 to get better   Short Term Goals   Short Term Goal # 1 Pt will complete functional transfers SBA   Short Term Goal # 2 Pt will complete LB dressing w/ A/E supervised   Short Term Goal # 3 Pt will complete toileting supervised   Short Term Goal # 4 Pt will recall 3/3 hip posterior precautions indep   Education Group   Education Provided Hip Precautions;Role of Occupational Therapist;Activities of Daily Living   Role of Occupational Therapist Patient Response Patient;Acceptance;Demonstration;Explanation;Action Demonstration;Verbal Demonstration   Hip Precautions Patient Response Patient;Acceptance;Demonstration;Explanation;Verbal Demonstration;Action Demonstration   ADL Patient Response Patient;Acceptance;Explanation;Demonstration;Verbal Demonstration;Action Demonstration   Occupational Therapy Initial Treatment Plan    Treatment Interventions Self Care / Activities of Daily Living;Adaptive Equipment;Therapeutic Exercises;Therapeutic Activity   Treatment Frequency 4 Times per Week   Duration Until Therapy Goals Met   Problem List   Problem List Decreased Active Daily Living Skills;Decreased Functional Mobility;Decreased Activity Tolerance   Anticipated Discharge Equipment and Recommendations   DC Equipment Recommendations Tub / Shower Seat   Discharge  Recommendations Recommend post-acute placement for additional occupational therapy services prior to discharge home   Interdisciplinary Plan of Care Collaboration   IDT Collaboration with  Nursing;Physical Therapist   Patient Position at End of Therapy Seated;Tray Table within Reach;Call Light within Reach   Collaboration Comments RN updated   Session Information   Date / Session Number  9/29, #1 (1/4, 10/05)

## 2024-09-29 NOTE — DISCHARGE PLANNING
"RN CM met with patient at bedside to complete assessment. Patient pleasantly A&Ox4 and able to verify information on face sheet.   Lives alone in [facility].   Her brother/NOK, Juni, lives in [facility]. She also has friends in [place] whom she can stay with upon discharge if indicated.   Normally independent with ADLs and IADLs.   Does not own or use DME at baseline, including oxygen.   Owns vehicle and drives at baseline.   PCP is Dr. Mckinley through [facility].   Insured by [facility], [employer], and Medicare.   Denies financial, mental health or substance abuse concerns.   OT recommending IPR, which patient is agreeable to. Patient is opposed to SNF placement.   Pending PT recommendations then physiatry consult. Anticipating IPR vs. Home with HH and support from friends/family.      Case Management Discharge Planning    Admission Date: 9/27/2024  GMLOS: 2.8  ALOS: 2    6-Clicks ADL Score:    6-Clicks Mobility Score:        Anticipated Discharge Dispo: Discharge Disposition: Disch to  rehab facility or distinct part unit (62)  Discharge Address: [address] [place] [address]  Discharge Contact Phone Number: [phone]    DME Needed: Yes    DME Ordered: No    Action(s) Taken: Updated Provider/Nurse on Discharge Plan, Patient Conference, and DC Assessment Complete (See below)    Escalations Completed: Provider and PT    Medically Clear: No    Next Steps: Pending PT/OT evaluations and acceptance with IPR if indicated (insurance auth. From [facility]).     Barriers to Discharge: Medical clearance, Pending Placement, and Pending PT Evaluation    Is the patient up for discharge tomorrow: Potentially.     Care Transition Team Assessment    Information Source  Orientation Level: Oriented X4  Information Given By: Patient  Informant's Name: \"Smith\"  Who is responsible for making decisions for patient? : Patient    Readmission Evaluation  Is this a readmission?: No    Elopement Risk  Legal Hold: No  Ambulatory or Self " Mobile in Wheelchair: Yes  Disoriented: No  Psychiatric Symptoms: None  History of Wandering: No  Elopement this Admit: No  Vocalizing Wanting to Leave: No  Displays Behaviors, Body Language Wanting to Leave: No-Not at Risk for Elopement  Elopement Risk: Not at Risk for Elopement    Interdisciplinary Discharge Planning  Does Admitting Nurse Feel This Could be a Complex Discharge?: No  Primary Care Physician: Dr. Mckinley with [facility]  Lives with - Patient's Self Care Capacity: Alone and Able to Care For Self  Patient or legal guardian wants to designate a caregiver: Yes  Caregiver name: Juni Gillespie  Caregiver contact info: Phone number is listed as the emergency contact number.  ([facility]) Authorization for Release of Health Information has been completed: Yes  Support Systems: Friends / Neighbors  Housing / Facility: [address] [facility]  Prior Services: Home-Independent    Discharge Preparedness  What is your plan after discharge?: Other (comment) (IPR)  What are your discharge supports?: Sibling, Other (comment) (Friend)  Prior Functional Level: Ambulatory, Drives Self, Independent with Activities of Daily Living, Independent with Medication Management  Difficulity with ADLs: None  Difficulity with IADLs: None    Functional Assesment  Prior Functional Level: Ambulatory, Drives Self, Independent with Activities of Daily Living, Independent with Medication Management    Finances  Financial Barriers to Discharge: No  Prescription Coverage: Yes    Values / Beliefs / Concerns  Values / Beliefs Concerns : No    Advance Directive  Advance Directive?: None  Advance Directive offered?: AD Booklet refused    Domestic Abuse  Possible Abuse/Neglect Reported to:: Not Applicable    Psychological Assessment  History of Substance Abuse: None  History of Psychiatric Problems: No  Non-compliant with Treatment: No  Newly Diagnosed Illness: Yes    Discharge Risks or Barriers  Discharge risks or barriers?: Lives alone, no community  support  Patient risk factors: Lives alone and no community support    Anticipated Discharge Information  Discharge Disposition: Disch to IP rehab facility or distinct part unit (62)  Discharge Address: [address] [place] [id number]  Discharge Contact Phone Number: [phone]

## 2024-09-29 NOTE — THERAPY
Physical Therapy   Initial Evaluation     Patient Name: Ashley Gillespie  Age:  73 y.o., Sex:  female  Medical Record #: [id number]  Today's Date: 9/29/2024     Precautions  Precautions: Fall Risk;Posterior Hip Precautions;Weight Bearing As Tolerated Left Lower Extremity    Assessment  Patient is 73 y.o. female presenting for GLF resulting in L femoral neck fx, now POD1 L hip hemiarthroplasty.  She lives alone in a SS home w/ 2STE, and reports having good outside support from friends for assistance w/ groceries, transportation, etc. (See flowsheet for home set-up and PLOF).    Currently, the pt is primarily limited in participation by light-headedness and hypotension, and displays gross LLE weakness and reduced ROM, balance and gait deficits, and poor activity tolerance limiting her ability to perform functional mobility tasks.  She was received seated in her chair w/ c/o lightheadedness, and BP noted at 76/59.  The pt was able to perform STS from the chair using FWW Elly for extension, and demo stand-step txr chair->EOB using FWW Elly for weight shift and stability.  Once returned to supine, pt's BP reading 104/53 and pt no longer symptomatic (RN notified).  Provided pt edu re: posterior hip precautions w/ good carryover verbalized/demonstrated.  Recommend IRF at this time given pt's current inability to care for herself, however anticipate she will progress to home w/ HH in 2-3 tx sessions given BP better controlled and she continues to mobilize to chair/bathroom.  Will follow for acute PT needs.      Plan    Physical Therapy Initial Treatment Plan   Treatment Plan : Bed Mobility, Equipment, Gait Training, Neuro Re-Education / Balance, Orthotics Training , Self Care / Home Evaluation, Stair Training, Therapeutic Activities, Therapeutic Exercise  Treatment Frequency: 5 Times per Week  Duration: Until Therapy Goals Met    DC Equipment Recommendations: Unable to determine at this time  Discharge  Recommendations: Recommend post-acute placement for additional physical therapy services prior to discharge home       Subjective/Objective       09/29/24 1026   Prior Living Situation   Prior Services Home-Independent   Housing / Facility 1 Story House   Steps Into Home 2   Steps In Home 0   Equipment Owned Single Point Cane   Lives with - Patient's Self Care Capacity Alone and Able to Care For Self   Comments pt reports having a lot of outside support from friends for assistance   Prior Level of Functional Mobility   Bed Mobility Independent   Transfer Status Independent   Ambulation Independent   Ambulation Distance Community distances   Assistive Devices Used None   Stairs Independent   History of Falls   History of Falls Yes   Date of Last Fall   (Reason for admit, tripped by dog)   Cognition    Cognition / Consciousness WDL   Level of Consciousness Alert   Comments pt pleasant and cooperative   Passive ROM Lower Body   Passive ROM Lower Body X   Comments L hip flexion limited by hip precautions   Active ROM Lower Body    Active ROM Lower Body  X   Comments LLE grossly limited by weakness   Strength Lower Body   Lower Body Strength  X   Comments LLE grossly 3-/5, RLE grossly 4+/5   Sensation Lower Body   Lower Extremity Sensation   WDL   Comments sensation intact to LT/DP   Coordination Lower Body    Coordination Lower Body  Not Tested   Other Treatments   Other Treatments Provided Edu provided re: posterior hip precautions   Balance Assessment   Sitting Balance (Static) Fair   Sitting Balance (Dynamic) Fair   Standing Balance (Static) Fair -   Standing Balance (Dynamic) Poor +   Weight Shift Sitting Fair   Weight Shift Standing Poor   Comments stand w/ FWW   Bed Mobility    Supine to Sit   (pt received seated in chair)   Sit to Supine Moderate Assist  (assist for LE positioning)   Scooting Supervised   Comments HOB flat, use of bed rails   Gait Analysis   Gait Level Of Assist Unable to Participate   Weight  Bearing Status WBAT LLE   Comments gait deferred 2/2 hypotension and symptomatic   Functional Mobility   Sit to Stand Minimal Assist   Bed, Chair, Wheelchair Transfer Minimal Assist   Transfer Method Stand Step   Mobility STS chair w/ FWW; chair->EOB w/ FWW   Activity Tolerance   Sitting in Chair 30min   Sitting Edge of Bed 2min   Standing <1min   Edema / Skin Assessment   Edema / Skin  Not Assessed   Short Term Goals    Short Term Goal # 1 Pt will demo supine<>sit EOB SPV w/ HOB flat and no rails in 6 visits for independence w/ bed mobility tasks.   Short Term Goal # 2 Pt will demo STS EOB w/ FWW SPV in 6 visits to prepare for OOB mobility tasks.   Short Term Goal # 3 Pt will demo gait >150' using FWW SPV in a moving environment in 6 visits for household ambulation.   Short Term Goal # 4 Pt will demo ability to ascend/descend 2 stairs w/ UE support and SPV in 6 visits for access to her home environment.   Education Group   Education Provided Role of Physical Therapist;Hip Precautions Posterior   Hip Precautions Posterior Patient Response Patient;Acceptance;Explanation;Demonstration;Handout;Verbal Demonstration;Action Demonstration   Role of Physical Therapist Patient Response Patient;Acceptance;Explanation;Demonstration;Action Demonstration   Additional Comments pt receptive of edu provided   Problem List    Problems Pain;Impaired Bed Mobility;Impaired Transfers;Impaired Ambulation;Functional ROM Deficit;Functional Strength Deficit;Impaired Balance;Decreased Activity Tolerance   Interdisciplinary Plan of Care Collaboration   IDT Collaboration with  Nursing   Patient Position at End of Therapy In Bed;Bed Alarm On;Call Light within Reach;Tray Table within Reach;Phone within Reach;Family / Friend in Room   Collaboration Comments regarding outcome of tx session   Session Information   Date / Session Number  9/29- 1 (1/5, 10/5)

## 2024-09-30 VITALS
HEART RATE: 82 BPM | HEIGHT: 68 IN | OXYGEN SATURATION: 95 % | RESPIRATION RATE: 16 BRPM | WEIGHT: 150.13 LBS | BODY MASS INDEX: 22.75 KG/M2 | DIASTOLIC BLOOD PRESSURE: 55 MMHG | TEMPERATURE: 98.8 F | SYSTOLIC BLOOD PRESSURE: 105 MMHG

## 2024-09-30 LAB
ANION GAP SERPL CALC-SCNC: 10 MMOL/L (ref 7–16)
BUN SERPL-MCNC: 10 MG/DL (ref 8–22)
CALCIUM SERPL-MCNC: 8.9 MG/DL (ref 8.5–10.5)
CHLORIDE SERPL-SCNC: 97 MMOL/L (ref 96–112)
CO2 SERPL-SCNC: 25 MMOL/L (ref 20–33)
CREAT SERPL-MCNC: 0.68 MG/DL (ref 0.5–1.4)
ERYTHROCYTE [DISTWIDTH] IN BLOOD BY AUTOMATED COUNT: 42.5 FL (ref 35.9–50)
GFR SERPLBLD CREATININE-BSD FMLA CKD-EPI: 91 ML/MIN/1.73 M 2
GLUCOSE SERPL-MCNC: 123 MG/DL (ref 65–99)
HCT VFR BLD AUTO: 25.6 % (ref 37–47)
HGB BLD-MCNC: 8.8 G/DL (ref 12–16)
MCH RBC QN AUTO: 32.5 PG (ref 27–33)
MCHC RBC AUTO-ENTMCNC: 34.4 G/DL (ref 32.2–35.5)
MCV RBC AUTO: 94.5 FL (ref 81.4–97.8)
PLATELET # BLD AUTO: 280 K/UL (ref 164–446)
PMV BLD AUTO: 10.1 FL (ref 9–12.9)
POTASSIUM SERPL-SCNC: 3.9 MMOL/L (ref 3.6–5.5)
RBC # BLD AUTO: 2.71 M/UL (ref 4.2–5.4)
SODIUM SERPL-SCNC: 132 MMOL/L (ref 135–145)
WBC # BLD AUTO: 5.7 K/UL (ref 4.8–10.8)

## 2024-09-30 PROCEDURE — 99233 SBSQ HOSP IP/OBS HIGH 50: CPT | Performed by: STUDENT IN AN ORGANIZED HEALTH CARE EDUCATION/TRAINING PROGRAM

## 2024-09-30 PROCEDURE — 770001 HCHG ROOM/CARE - MED/SURG/GYN PRIV*

## 2024-09-30 PROCEDURE — 700102 HCHG RX REV CODE 250 W/ 637 OVERRIDE(OP): Performed by: ORTHOPAEDIC SURGERY

## 2024-09-30 PROCEDURE — A9270 NON-COVERED ITEM OR SERVICE: HCPCS | Performed by: ORTHOPAEDIC SURGERY

## 2024-09-30 PROCEDURE — 36415 COLL VENOUS BLD VENIPUNCTURE: CPT

## 2024-09-30 PROCEDURE — 97530 THERAPEUTIC ACTIVITIES: CPT

## 2024-09-30 PROCEDURE — 85027 COMPLETE CBC AUTOMATED: CPT

## 2024-09-30 PROCEDURE — 80048 BASIC METABOLIC PNL TOTAL CA: CPT

## 2024-09-30 PROCEDURE — 700111 HCHG RX REV CODE 636 W/ 250 OVERRIDE (IP): Mod: JZ | Performed by: ORTHOPAEDIC SURGERY

## 2024-09-30 RX ADMIN — POLYETHYLENE GLYCOL 3350 1 PACKET: 17 POWDER, FOR SOLUTION ORAL at 05:19

## 2024-09-30 RX ADMIN — OXYCODONE HYDROCHLORIDE 10 MG: 10 TABLET ORAL at 16:07

## 2024-09-30 RX ADMIN — OXYCODONE HYDROCHLORIDE 10 MG: 10 TABLET ORAL at 20:09

## 2024-09-30 RX ADMIN — ACETAMINOPHEN 650 MG: 325 TABLET ORAL at 00:16

## 2024-09-30 RX ADMIN — ACETAMINOPHEN 650 MG: 325 TABLET ORAL at 23:37

## 2024-09-30 RX ADMIN — OXYCODONE HYDROCHLORIDE 10 MG: 10 TABLET ORAL at 05:20

## 2024-09-30 RX ADMIN — ACETAMINOPHEN 650 MG: 325 TABLET ORAL at 16:06

## 2024-09-30 RX ADMIN — OXYCODONE HYDROCHLORIDE 10 MG: 10 TABLET ORAL at 11:33

## 2024-09-30 RX ADMIN — SENNOSIDES AND DOCUSATE SODIUM 1 TABLET: 50; 8.6 TABLET ORAL at 20:04

## 2024-09-30 RX ADMIN — DOCUSATE SODIUM 100 MG: 100 CAPSULE, LIQUID FILLED ORAL at 05:19

## 2024-09-30 RX ADMIN — HYDROMORPHONE HYDROCHLORIDE 0.5 MG: 1 INJECTION, SOLUTION INTRAMUSCULAR; INTRAVENOUS; SUBCUTANEOUS at 13:34

## 2024-09-30 RX ADMIN — ACETAMINOPHEN 650 MG: 325 TABLET ORAL at 05:19

## 2024-09-30 RX ADMIN — ACETAMINOPHEN 650 MG: 325 TABLET ORAL at 11:33

## 2024-09-30 RX ADMIN — OXYCODONE HYDROCHLORIDE 10 MG: 10 TABLET ORAL at 00:16

## 2024-09-30 RX ADMIN — DOCUSATE SODIUM 100 MG: 100 CAPSULE, LIQUID FILLED ORAL at 16:06

## 2024-09-30 ASSESSMENT — COGNITIVE AND FUNCTIONAL STATUS - GENERAL
SUGGESTED CMS G CODE MODIFIER MOBILITY: CK
WALKING IN HOSPITAL ROOM: A LITTLE
STANDING UP FROM CHAIR USING ARMS: A LITTLE
TURNING FROM BACK TO SIDE WHILE IN FLAT BAD: A LITTLE
MOVING TO AND FROM BED TO CHAIR: A LITTLE
MOBILITY SCORE: 17
CLIMB 3 TO 5 STEPS WITH RAILING: A LOT
MOVING FROM LYING ON BACK TO SITTING ON SIDE OF FLAT BED: A LITTLE

## 2024-09-30 ASSESSMENT — GAIT ASSESSMENTS
DISTANCE (FEET): 50
DEVIATION: ANTALGIC;DECREASED TOE OFF;DECREASED HEEL STRIKE
GAIT LEVEL OF ASSIST: CONTACT GUARD ASSIST
ASSISTIVE DEVICE: FRONT WHEEL WALKER

## 2024-09-30 ASSESSMENT — PAIN DESCRIPTION - PAIN TYPE
TYPE: ACUTE PAIN
TYPE: ACUTE PAIN;SURGICAL PAIN
TYPE: SURGICAL PAIN
TYPE: ACUTE PAIN;SURGICAL PAIN
TYPE: ACUTE PAIN

## 2024-09-30 ASSESSMENT — ENCOUNTER SYMPTOMS: FALLS: 1

## 2024-09-30 NOTE — CARE PLAN
The patient is Stable - Low risk of patient condition declining or worsening    Shift Goals  Clinical Goals: pain management, comfort, rest  Patient Goals: pain control  Family Goals: not present    Progress made toward(s) clinical / shift goals:  yes      Problem: Knowledge Deficit - Standard  Goal: Patient and family/care givers will demonstrate understanding of plan of care, disease process/condition, diagnostic tests and medications  Description: Target End Date:  1-3 days or as soon as patient condition allows    Document in Patient Education    1.  Patient and family/caregiver oriented to unit, equipment, visitation policy and means for communicating concern  2.  Complete/review Learning Assessment  3.  Assess knowledge level of disease process/condition, treatment plan, diagnostic tests and medications  4.  Explain disease process/condition, treatment plan, diagnostic tests and medications  Outcome: Progressing     Problem: Pain - Standard  Goal: Alleviation of pain or a reduction in pain to the patient’s comfort goal  Description: Target End Date:  Prior to discharge or change in level of care    Document on Vitals flowsheet    1.  Document pain using the appropriate pain scale per order or unit policy  2.  Educate and implement non-pharmacologic comfort measures (i.e. relaxation, distraction, massage, cold/heat therapy, etc.)  3.  Pain management medications as ordered  4.  Reassess pain after pain med administration per policy  5.  If opiods administered assess patient's response to pain medication is appropriate per POSS sedation scale  6.  Follow pain management plan developed in collaboration with patient and interdisciplinary team (including palliative care or pain specialists if applicable)  Outcome: Progressing     Problem: Skin Integrity  Goal: Skin integrity is maintained or improved  Description: Target End Date:  Prior to discharge or change in level of care    Document interventions on Skin  Risk/Caio flowsheet groups and corresponding LDA    1.  Assess and monitor skin integrity, appearance and/or temperature  2.  Assess risk factors for impaired skin integrity and/or pressures ulcers  3.  Implement precautions to protect skin integrity in collaboration with interdisciplinary team  4.  Implement pressure ulcer prevention protocol if at risk for skin breakdown  5.  Confirm wound care consult if at risk for skin breakdown  6.  Ensure patient use of pressure relieving devices  (Low air loss bed, waffle overlay, heel protectors, ROHO cushion, etc)  Outcome: Progressing     Problem: Fall Risk  Goal: Patient will remain free from falls  Description: Target End Date:  Prior to discharge or change in level of care    Document interventions on the [facility] Fall Risk Assessment    1.  Assess for fall risk factors  2.  Implement fall precautions  Outcome: Progressing     Problem: Respiratory  Goal: Patient will achieve/maintain optimum respiratory ventilation and gas exchange  Description: Target End Date:  Prior to discharge or change in level of care    Document on Assessment flowsheet    1.  Assess and monitor rate, rhythm, depth and effort of respiration  2.  Breath sounds assessed qshift and/or as needed  3.  Assess O2 saturation, administer/titrate oxygen as ordered  4.  Position patient for maximum ventilatory efficiency  5.  Turn, cough, and deep breath with splinting to improve effectiveness  6.  Collaborate with RT to administer medication/treatments per order  7.  Encourage use of incentive spirometer and encourage patient to cough after use and utilize splinting techniques if applicable  8.  Airway suctioning  9.  Monitor sputum production for changes in color, consistency and frequency  10. Perform frequent oral hygiene  11. Alternate physical activity with rest periods  Outcome: Progressing     Problem: Infection - Standard  Goal: Patient will remain free from infection  Description: Target  End Date:  Prior to discharge or change in level of care    1.  Utilize Standard Precautions at all times to reduce the risk of transmission of microorganisms from both recognized and  unrecognized sources of infection  2.  Infection prevention handouts provided (general/device/diagnosis specific) and documented in Patient Education  3.  Educate patient and family/caregiver on isolation precautions if applicable  Outcome: Progressing     Problem: Wound/ / Incision Healing  Goal: Patient's wound/surgical incision will decrease in size and heals properly  Description: Target End Date:  Prior to discharge or change in level of care    Document on LDA    1.  Assess and document surgical incision/wound  2.  Provide incision/wound care per policy and/or provider orders  3.  Manage surgical drains per policy if applicable  4.  Encourage adequate nutrition to promote wound healing  5.  Collaborate with Clinical Dietician  Outcome: Progressing

## 2024-09-30 NOTE — CARE PLAN
The patient is Stable - Low risk of patient condition declining or worsening    Shift Goals  Clinical Goals: Discharge patient to IPR, pain management  Patient Goals: Pain management, Discharge to rehab  Family Goals: Not present    Progress made toward(s) clinical / shift goals:    Problem: Knowledge Deficit - Standard  Goal: Patient and family/care givers will demonstrate understanding of plan of care, disease process/condition, diagnostic tests and medications  Outcome: Progressing     Problem: Pain - Standard  Goal: Alleviation of pain or a reduction in pain to the patient’s comfort goal  Outcome: Progressing     Problem: Skin Integrity  Goal: Skin integrity is maintained or improved  Outcome: Progressing     Problem: Fall Risk  Goal: Patient will remain free from falls  Outcome: Progressing     Problem: Respiratory  Goal: Patient will achieve/maintain optimum respiratory ventilation and gas exchange  Outcome: Progressing     Problem: Infection - Standard  Goal: Patient will remain free from infection  Outcome: Progressing     Problem: Wound/ / Incision Healing  Goal: Patient's wound/surgical incision will decrease in size and heals properly  Outcome: Progressing       Patient is not progressing towards the following goals:

## 2024-09-30 NOTE — PROGRESS NOTES
"    Orthopedic PA Progress Note    Interval changes:  Patient doing well. Awaiting insurance auth for rehab  LLE dressings are CDI  WBAT LLE  Follow up with the [facility] trauma LISA clinic 2 weeks postop.  Cleared for DC from orthopedic standpoint     ROS - Patient denies any new issues.  Denies any numbness or tingling. Pain well controlled.    BP (!) 152/62   Pulse 74   Temp 36.5 °C (97.7 °F) (Temporal)   Resp 17   Ht 1.727 m (5' 8\")   Wt 68.1 kg (150 lb 2.1 oz)   SpO2 96%     Patient seen and examined  No acute distress  Breathing non labored  RRR  LLE: Surgical dressing is clean, dry, and intact. Patient clearly fires tibialis anterior, EHL, and gastrocnemius/soleus. Sensation is intact to light touch throughout superficial peroneal, deep peroneal, tibial, saphenous, and sural nerve distributions. Strong and palpable 2+ dorsalis pedis and posterior tibial pulses with capillary refill less than 2 seconds.   Recent Labs     09/28/24  0219 09/29/24  0906 09/30/24  0621   WBC 6.1 5.6 5.7   RBC 4.02* 2.97* 2.71*   HEMOGLOBIN 12.8 9.7* 8.8*   HEMATOCRIT 37.5 27.4* 25.6*   MCV 93.3 92.3 94.5   MCH 31.8 32.7 32.5   MCHC 34.1 35.4 34.4   RDW 42.1 41.1 42.5   PLATELETCT 336 262 280   MPV 9.7 9.5 10.1       Active Hospital Problems    Diagnosis     Advance care planning [Z71.89]     Closed left hip fracture, initial encounter ([facility]) [S72.002A]     Preop cardiovascular exam [Z01.810]     Hyponatremia [E87.1]     Left displaced femoral neck fracture ([facility]) [S72.002A]        No orders to display       Assessment/Plan:  Patient doing well. Awaiting insurance auth for rehab  LLE dressings are CDI  WBAT LLE  Follow up with the [facility] trauma LISA clinic 2 weeks postop.  Cleared for DC from orthopedic standpoint     POD#2 S/p  Open treatment of left femoral fracture, proximal end, neck with prosthetic replacement   Wt bearing status - WBAT LLE with posterior hip precautions  Wound care/Drains - Dressings to be changed every other day by " GCSF injection after discharge   nursing. Or PRN for saturation starting POD#2  Future Procedures - None planned   Lovenox: Start 9/28, Duration-until ambulatory > 150'  Sutures/Staples out- 14-21 days post operatively. Removal will completed by ortho LISA's unless transferred.  DVT Prophylaxis outpatient: ASA 81 mg PO BID x4 weeks  PT/OT-initiated  Antibiotics:  Perioperative completed  DVT Prophylaxis- TEDS/SCDs/Foot pumps  Alvarado-not needed per ortho  Case Coordination for Discharge Planning - Disposition per therapy recs.

## 2024-09-30 NOTE — PROGRESS NOTES
Hospital Medicine Daily Progress Note    Date of Service  9/29/2024    Chief Complaint  Ashley Gillespie is a 73 y.o. female admitted 9/27/2024 with fall    Hospital Course  73 y.o. female who [facility]/27/2024. Patient fell on 9/13. She lost balance when opening the door for her Dog. [facility], and no fracture was found, however pain is getting progressively worse. She purchased a cane to help with ambulation but [facility] CT pelvis was done showing Left Subcapital fracture. She is transferred to [facility] because there are no [facility] to perform [facility].   Orthopedics was consulted, patient underwent Open treatment of left femoral fracture, proximal end, neck with prosthetic replacement 9/28.   Recommends bear weight as tolerated on their operative extremity with posterior hip precautions.     Interval Problem Update  Seen patient at bedside  Patient underwent surgery yesterday  Still reports left hip pain with movements  PT/OT/PMR  Multimodal pain management    I have discussed this patient's plan of care and discharge plan at IDT rounds today with Case Management, Nursing, Nursing leadership, and other members of the IDT team.    Consultants/Specialty  orthopedics    Code Status  Full Code    Disposition  The patient is not medically cleared for discharge to home or a post-acute facility.      I have placed the appropriate orders for post-discharge needs.    Review of Systems  Review of Systems   Musculoskeletal:  Positive for falls and joint pain.   All other systems reviewed and are negative.       Physical Exam  Temp:  [36.3 °C (97.3 °F)-37.4 °C (99.3 °F)] 36.9 °C (98.4 °F)  Pulse:  [74-90] 79  Resp:  [15-16] 16  BP: (106-127)/(53-65) 120/63  SpO2:  [92 %-98 %] 96 %    Physical Exam  Vitals and nursing note reviewed.    Constitutional:       Appearance: Normal appearance.   HENT:      Head: Normocephalic and atraumatic.      Nose: Nose normal.      Mouth/Throat:      Pharynx: Oropharynx is clear.   Eyes:      Extraocular Movements: Extraocular movements intact.      Conjunctiva/sclera: Conjunctivae normal.      Pupils: Pupils are equal, round, and reactive to light.   Cardiovascular:      Rate and Rhythm: Normal rate and regular rhythm.      Pulses: Normal pulses.      Heart sounds: Normal heart sounds.   Pulmonary:      Effort: Pulmonary effort is normal.      Breath sounds: Normal breath sounds.   Abdominal:      General: Abdomen is flat. Bowel sounds are normal.      Palpations: Abdomen is soft.   Musculoskeletal:         General: Tenderness present.      Cervical back: Normal range of motion and neck supple.      Comments: Limited range of motion of the left hip due to pain   Skin:     General: Skin is warm and dry.   Neurological:      General: No focal deficit present.      Mental Status: She is alert and oriented to person, place, and time. Mental status is at baseline.   Psychiatric:         Mood and Affect: Mood normal.         Behavior: Behavior normal.         Fluids  No intake or output data in the 24 hours ending 09/29/24 1711     Laboratory  Recent Labs     09/28/24  0219 09/29/24  0906   WBC 6.1 5.6   RBC 4.02* 2.97*   HEMOGLOBIN 12.8 9.7*   HEMATOCRIT 37.5 27.4*   MCV 93.3 92.3   MCH 31.8 32.7   MCHC 34.1 35.4   RDW 42.1 41.1   PLATELETCT 336 262   MPV 9.7 9.5     Recent Labs     09/28/24  0219 09/29/24  0906   SODIUM 141 134*   POTASSIUM 3.8 3.9   CHLORIDE 106 101   CO2 22 24   GLUCOSE 106* 119*   BUN 10 13   CREATININE 0.68 0.74   CALCIUM 9.2 8.8                   Imaging  No orders to display        Assessment/Plan  * Closed left hip fracture, initial encounter (HCC)- (present on admission)  Assessment & Plan  -Inpatient status: Medical Floor  -NPO at 12 am: OK to have a light meal before 12 am.   [facility] Acute [facility]capital femoral neck fracture. Images were uploaded.  -I appreciate Orthopedic Consult and recommendations. I personally discussed this case with Dr. Schroeder. Plan for OR in am  -Pain control with IV narcotics    Advance care planning  Assessment & Plan  Patient is 73-year-old female, admitted with displaced left femoral neck fracture, requiring surgical interventions, PT/OT/PMR.  I discussed goal of care and CODE STATUS with patient at bedside.  Patient has a medical decision capacity.  The patient confirms DNR/DNI. ACP 16 mins    Left displaced femoral neck fracture (HCC)- (present on admission)  Assessment & Plan  CT pelvis outside hospital showing Left Subcapital fracture.   Orthopedics was consulted, patient underwent Open treatment of left femoral fracture, proximal end, neck with prosthetic replacement 9/28.   Recommends bear weight as tolerated on their operative extremity with posterior hip precautions.   PT/OT/PMR  Multimodal pain managements including po and iv narcotics prn. Monitoring respiratory status and sedation score      Hyponatremia  Assessment & Plan  Na at outside facility was 135. This is likely reactive. Monitor chemistry panel in am    Preop cardiovascular exam  Assessment & Plan  Cardiovascular:   Patient does not have history of CHF  Pre-op EKG: Yes    Pulmonary:  Oxygen per protocol    GI:   No history of cirrhosis. Standard bowel regimen. Hold for loose stools.    Renal:   IV fluids: -150 mL/hr for 2 days   Labs: Metabolic Panel with AM labs    Musculoskeletal:   Check 25 OH vitamin D level. If 31-40 pg/mL, consider starting vitamin D3 1000 IU PO daily. If 20-30 pg/mL, consider vitamin D3 2000 IU PO daily. If <20 pg/mL, vitamin D2 50,000 IU weekly x 8 weeks then 2000 IU PO daily.    Neurologic:   Pain Control: Neuro checks every 4 hours  Avoid fentanyl (short-acting)  Acetaminophen 1000 mg PO TID; 650 mg PO TID if liver problems    Hematologic:  Plan on  pharmacologic DVT prophylaxis post operative day #1. Hold for decreasing hemoglobin. Notify provider for hemoglobin less than 8.  Order preoperative type and cross.   If patient was on anticoagulation prior to arrival risks and benefits will be weighed by teams including surgery, hospitalist, geriatrics, and anesthesia for delaying surgery more than 24 hours.   On anticoagulation prior to arrival: No    Medical Assessment Risk:  Intermediate    Surgical Risk:   Intermediate    No prior PMH, she is medically optimized for Surgical procedure without additional workup           VTE prophylaxis: lovenox    I have performed a physical exam and reviewed and updated ROS and Plan today (9/29/2024). In review of yesterday's note (9/28/2024), there are no changes except as documented above.    Patient is has a high medical complexity, complex decision making and is at high risk for complication, morbidity, and mortality.  I spent 68 minutes, reviewing the chart, obtaining and/or reviewing separately obtained history. Performing a medically appropriate examination and evaluation.  Counseling and educating the patient. Ordering and reviewing medications, tests, or procedures.  Discussing the case with ortho.  Documenting clinical information in EPIC. Independently interpreting results and communicating results to patient. Discussing future disposition of care with patient, RN and case management.  This does not include time spent on separately billable procedures/tests.

## 2024-09-30 NOTE — ASSESSMENT & PLAN NOTE
CT pelvis outside hospital showing Left Subcapital fracture.   Orthopedics was consulted, patient underwent Open treatment of left femoral fracture, proximal end, neck with prosthetic replacement 9/28.   Recommends bear weight as tolerated on their operative extremity with posterior hip precautions.   PT/OT/PMR  Multimodal pain managements including po and iv narcotics prn. Monitoring respiratory status and sedation score

## 2024-09-30 NOTE — DISCHARGE PLANNING
Per PMR patient is a candidate for IPR. Reached out to brother Juni to verify dc support. Juni reports plan is for patient to stay with friend Rut who has a wheelchair accessible bedroom and bathroom for patient, Rut is retired and can provide 24/7 assist. Brother reports if that falls through for some reason, patient is welcome to stay with him at his [facility] with 2 CHARLEY. Brother is also retired and able to provide 24/7 assist. Brother reports pt has a good network of friends that can assist patient as needed. Patient was unable to participate in gait d/t hypotension yesterday, was independent with mobility PTA.     [facility] for authorization.

## 2024-09-30 NOTE — ASSESSMENT & PLAN NOTE
Patient is 73-year-old female, admitted with displaced left femoral neck fracture, requiring surgical interventions, PT/OT/PMR.  I discussed goal of care and CODE STATUS with patient at bedside.  Patient has a medical decision capacity.  The patient confirms DNR/DNI. ACP 16 mins

## 2024-09-30 NOTE — THERAPY
Physical Therapy   Daily Treatment     Patient Name: Ashley Gillespie  Age:  73 y.o., Sex:  female  Medical Record #: [id number]  Today's Date: 9/30/2024     Precautions  Precautions: Fall Risk;Posterior Hip Precautions;Weight Bearing As Tolerated Left Lower Extremity    Assessment  Pt was receptive to therapy and demonstrated good tolerance following posterior hip precautions. PT monitored pt's BP throughout therapy as pt presented hypotensive last session. Pt demonstrated increased tolerance to ambulation as she was able to complete 50 ft 2x with a FWW and CGA. Pt did defer stair training as pt reported pain in L hip and requested to return to hospital room. At the end of session pt showed a notable decrease in BP and reported mild dizziness. RN is aware and pr was educated reclining chair if symptoms persist. Pt is progressing as expected and will continue to benefit from therapy needs.   Plan    Treatment Plan Status: Continue Current Treatment Plan  Type of Treatment: Bed Mobility, Equipment, Gait Training, Neuro Re-Education / Balance, Orthotics Training , Self Care / Home Evaluation, Stair Training, Therapeutic Activities, Therapeutic Exercise  Treatment Frequency: 5 Times per Week  Treatment Duration: Until Therapy Goals Met    DC Equipment Recommendations: Front-Wheel Walker  Discharge Recommendations: Recommend post-acute placement for additional physical therapy services prior to discharge home         09/30/24 0916    Services   Is patient using  services for this encounter? No   Precautions   Precautions Fall Risk;Posterior Hip Precautions;Weight Bearing As Tolerated Left Lower Extremity   Vitals   Patient BP Position Sitting   Blood Pressure  (!) 152/62   O2 (LPM) 0   O2 Delivery Device None - Room Air   Vitals Comments Pt BP dropped to 118/81 post ambulating 50 ft. Nurse made aware. Pt encouraged to recline chair if dizzy and press call light for assistance.   Pain 0 - 10  Group   Therapist Pain Assessment Prior to Activity;During Activity;Post Activity;Nurse Notified  (L hip)   Cognition    Cognition / Consciousness WDL   Level of Consciousness Alert   Other Treatments   Other Treatments Provided Pt education on posterior hip precautions. Disccuessed d/c planning.   Balance   Sitting Balance (Static) Fair   Sitting Balance (Dynamic) Fair   Standing Balance (Static) Fair -   Standing Balance (Dynamic) Fair -   Weight Shift Sitting Fair   Weight Shift Standing Fair   Skilled Intervention Verbal Cuing   Comments FWW   Bed Mobility    Supine to Sit Standby Assist   Scooting Supervised   Rolling Supervised   Skilled Intervention Verbal Cuing;Sequencing   Gait Analysis   Gait Level Of Assist Contact Guard Assist   Assistive Device Front Wheel Walker   Distance (Feet) 50   # of Times Distance was Traveled 2   Deviation Antalgic;Decreased Toe Off;Decreased Heel Strike   Weight Bearing Status WBAT LLE   Skilled Intervention Verbal Cuing;Sequencing;Compensatory Strategies   Comments Stairs deferred due to LLE pain   Functional Mobility   Sit to Stand Contact Guard Assist   Bed, Chair, Wheelchair Transfer Contact Guard Assist   Toilet Transfers Contact Guard Assist   Transfer Method Stand Step   Mobility bed mobility > toliet > ambulation > chair   Skilled Intervention Verbal Cuing;Tactile Cuing;Sequencing   Comments FFW   6 Clicks Assessment - How much HELP from from another person do you currently need... (If the patient hasn't done an activity recently, how much help from another person do you think he/she would need if he/she tried?)   Turning from your back to your side while in a flat bed without using bedrails? 3   Moving from lying on your back to sitting on the side of a flat bed without using bedrails? 3   Moving to and from a bed to a chair (including a wheelchair)? 3   Standing up from a chair using your arms (e.g., wheelchair, or bedside chair)? 3   Walking in hospital room? 3    Climbing 3-5 steps with a railing? 2   6 clicks Mobility Score 17   Short Term Goals    Short Term Goal # 1 Pt will demo supine<>sit EOB SPV w/ HOB flat and no rails in 6 visits for independence w/ bed mobility tasks.   Goal Outcome # 1 Progressing as expected   Short Term Goal # 2 Pt will demo STS EOB w/ FWW SPV in 6 visits to prepare for OOB mobility tasks.   Goal Outcome # 2 Progressing as expected   Short Term Goal # 3 Pt will demo gait >150' using FWW SPV in a moving environment in 6 visits for household ambulation.   Goal Outcome # 3 Goal not met   Short Term Goal # 4 Pt will demo ability to ascend/descend 2 stairs w/ UE support and SPV in 6 visits for access to her home environment.   Goal Outcome # 4 Goal not met   Education Group   Education Provided Role of Physical Therapist;Gait Training;Hip Precautions Posterior   Hip Precautions Posterior Patient Response Patient;Acceptance;Explanation;Verbal Demonstration   Role of Physical Therapist Patient Response Patient;Acceptance;Explanation;Verbal Demonstration   Gait Training Patient Response Patient;Acceptance;Explanation;Verbal Demonstration   Physical Therapy Treatment Plan   Physical Therapy Treatment Plan Continue Current Treatment Plan   Anticipated Discharge Equipment and Recommendations   DC Equipment Recommendations Front-Wheel Walker   Discharge Recommendations Recommend post-acute placement for additional physical therapy services prior to discharge home   Interdisciplinary Plan of Care Collaboration   IDT Collaboration with  Nursing   Patient Position at End of Therapy Seated;Chair Alarm On;Call Light within Reach;Tray Table within Reach;Phone within Reach   Collaboration Comments RN updated   Session Information   Date / Session Number  9/30- 2(2/5, 10/5)

## 2024-09-30 NOTE — DISCHARGE PLANNING
Good candidate for IPR per physiatry.   Discharge support confirmed by IPR TCC.   [facility].  Medically cleared for IPR once authorization is received.      Case Management Discharge Planning    Admission Date: 9/27/2024  GMLOS: 2.8  ALOS: 3    6-Clicks ADL Score: 19  6-Clicks Mobility Score: 16  PT and/or OT Eval ordered: Yes  Post-acute Referrals Ordered: Yes  Post-acute Choice Obtained: Yes  Has referral(s) been sent to post-acute provider:  Yes      Anticipated Discharge Dispo: Discharge Disposition: Disch to  rehab facility or distinct part unit (62)  Discharge Address: [address] [place] [id number]  Discharge Contact Phone Number: [phone]    DME Needed: No    Action(s) Taken: Updated Provider/Nurse on Discharge Plan and Authorization Sent    Escalations Completed: Insurance     Medically Clear: Yes    Next Steps: Pending [facility] authorization for IPR.     Barriers to Discharge: Pending Insurance Authorization    Is the patient up for discharge tomorrow: Potentially.

## 2024-09-30 NOTE — PROGRESS NOTES
Virtual Nurse rounding complete.  Rounding Needs: Patient resting comfortably with no needs at this time.    Patient would like pain medications. Bedside RN notified

## 2024-09-30 NOTE — CARE PLAN
The patient is Stable - Low risk of patient condition declining or worsening    Shift Goals  Clinical Goals: PT/OT, pain management  Patient Goals: pain control  Family Goals: not present    Progress made toward(s) clinical / shift goals:  yes      Problem: Knowledge Deficit - Standard  Goal: Patient and family/care givers will demonstrate understanding of plan of care, disease process/condition, diagnostic tests and medications  Outcome: Progressing     Problem: Pain - Standard  Goal: Alleviation of pain or a reduction in pain to the patient’s comfort goal  Outcome: Progressing     Problem: Skin Integrity  Goal: Skin integrity is maintained or improved  Outcome: Progressing     Problem: Fall Risk  Goal: Patient will remain free from falls  Outcome: Progressing     Problem: Respiratory  Goal: Patient will achieve/maintain optimum respiratory ventilation and gas exchange  Outcome: Progressing     Problem: Mobility  Goal: Patient's capacity to carry out activities will improve  Outcome: Progressing  Flowsheets (Taken 9/29/2024 1805)  Mobility:   Encouraged mobilization per interdisciplinary team recommendations   Monitored for signs of activity intolerance   Provided assistive devices   Provided rest periods between activities   Administered pain management to allow progressive mobilization   Collaborated with PT/OT     Problem: Infection - Standard  Goal: Patient will remain free from infection  Outcome: Progressing  Flowsheets (Taken 9/29/2024 1805)  Standard Infection Interventions:   Assessed for signs and symptoms of infection   Implemented standard precautions   Instructed patient/family on signs and symptoms of infection   Provided education on proper hand hygiene and infection prevention measures   Assessed for removal IV, central lines, intra-arterial or urinary catheters     Problem: Wound/ / Incision Healing  Goal: Patient's wound/surgical incision will decrease in size and heals properly  Outcome:  Progressing

## 2024-10-01 PROBLEM — Z01.810 PREOP CARDIOVASCULAR EXAM: Status: RESOLVED | Noted: 2024-09-27 | Resolved: 2024-10-01

## 2024-10-01 LAB
ANION GAP SERPL CALC-SCNC: 9 MMOL/L (ref 7–16)
BUN SERPL-MCNC: 13 MG/DL (ref 8–22)
CALCIUM SERPL-MCNC: 9.3 MG/DL (ref 8.5–10.5)
CHLORIDE SERPL-SCNC: 100 MMOL/L (ref 96–112)
CO2 SERPL-SCNC: 25 MMOL/L (ref 20–33)
CREAT SERPL-MCNC: 0.58 MG/DL (ref 0.5–1.4)
ERYTHROCYTE [DISTWIDTH] IN BLOOD BY AUTOMATED COUNT: 42.7 FL (ref 35.9–50)
GFR SERPLBLD CREATININE-BSD FMLA CKD-EPI: 95 ML/MIN/1.73 M 2
GLUCOSE SERPL-MCNC: 121 MG/DL (ref 65–99)
HCT VFR BLD AUTO: 24.8 % (ref 37–47)
HGB BLD-MCNC: 8.7 G/DL (ref 12–16)
MCH RBC QN AUTO: 33 PG (ref 27–33)
MCHC RBC AUTO-ENTMCNC: 35.1 G/DL (ref 32.2–35.5)
MCV RBC AUTO: 93.9 FL (ref 81.4–97.8)
PLATELET # BLD AUTO: 286 K/UL (ref 164–446)
PMV BLD AUTO: 9.5 FL (ref 9–12.9)
POTASSIUM SERPL-SCNC: 4.6 MMOL/L (ref 3.6–5.5)
RBC # BLD AUTO: 2.64 M/UL (ref 4.2–5.4)
SODIUM SERPL-SCNC: 134 MMOL/L (ref 135–145)
WBC # BLD AUTO: 6.3 K/UL (ref 4.8–10.8)

## 2024-10-01 PROCEDURE — 700102 HCHG RX REV CODE 250 W/ 637 OVERRIDE(OP): Performed by: ORTHOPAEDIC SURGERY

## 2024-10-01 PROCEDURE — 99232 SBSQ HOSP IP/OBS MODERATE 35: CPT | Performed by: PHYSICAL MEDICINE & REHABILITATION

## 2024-10-01 PROCEDURE — 80048 BASIC METABOLIC PNL TOTAL CA: CPT

## 2024-10-01 PROCEDURE — 99233 SBSQ HOSP IP/OBS HIGH 50: CPT | Performed by: STUDENT IN AN ORGANIZED HEALTH CARE EDUCATION/TRAINING PROGRAM

## 2024-10-01 PROCEDURE — A9270 NON-COVERED ITEM OR SERVICE: HCPCS | Performed by: ORTHOPAEDIC SURGERY

## 2024-10-01 PROCEDURE — 770001 HCHG ROOM/CARE - MED/SURG/GYN PRIV*

## 2024-10-01 PROCEDURE — 36415 COLL VENOUS BLD VENIPUNCTURE: CPT

## 2024-10-01 PROCEDURE — 700111 HCHG RX REV CODE 636 W/ 250 OVERRIDE (IP): Mod: JZ | Performed by: ORTHOPAEDIC SURGERY

## 2024-10-01 PROCEDURE — 85027 COMPLETE CBC AUTOMATED: CPT

## 2024-10-01 RX ADMIN — ACETAMINOPHEN 650 MG: 325 TABLET ORAL at 04:47

## 2024-10-01 RX ADMIN — BISACODYL 10 MG: 10 SUPPOSITORY RECTAL at 04:47

## 2024-10-01 RX ADMIN — ENOXAPARIN SODIUM 40 MG: 100 INJECTION SUBCUTANEOUS at 21:11

## 2024-10-01 RX ADMIN — DOCUSATE SODIUM 100 MG: 100 CAPSULE, LIQUID FILLED ORAL at 04:47

## 2024-10-01 RX ADMIN — OXYCODONE HYDROCHLORIDE 10 MG: 10 TABLET ORAL at 21:15

## 2024-10-01 RX ADMIN — ACETAMINOPHEN 650 MG: 325 TABLET ORAL at 16:59

## 2024-10-01 RX ADMIN — SENNOSIDES AND DOCUSATE SODIUM 1 TABLET: 50; 8.6 TABLET ORAL at 21:11

## 2024-10-01 RX ADMIN — DOCUSATE SODIUM 100 MG: 100 CAPSULE, LIQUID FILLED ORAL at 17:01

## 2024-10-01 RX ADMIN — OXYCODONE HYDROCHLORIDE 10 MG: 10 TABLET ORAL at 02:07

## 2024-10-01 RX ADMIN — OXYCODONE HYDROCHLORIDE 10 MG: 10 TABLET ORAL at 10:20

## 2024-10-01 RX ADMIN — OXYCODONE HYDROCHLORIDE 10 MG: 10 TABLET ORAL at 15:00

## 2024-10-01 RX ADMIN — ACETAMINOPHEN 650 MG: 325 TABLET ORAL at 12:15

## 2024-10-01 ASSESSMENT — PAIN DESCRIPTION - PAIN TYPE
TYPE: ACUTE PAIN;SURGICAL PAIN

## 2024-10-01 NOTE — PROGRESS NOTES
Hospital Medicine Daily Progress Note    Date of Service  10/1/2024    Chief Complaint  Ashley Gillespie is a 73 y.o. female admitted 9/27/2024 with fall    Hospital Course  73 y.o. female who [facility] Fracture on 9/27/2024. Patient fell on 9/13. She lost balance when opening the door for her Dog. [facility], and no fracture was found, however pain is getting progressively worse. She purchased a cane to help with ambulation but [facility] CT pelvis was done showing Left Subcapital fracture. She is transferred [facility] because [facility] are no [facility] until [facility] to perform procedure.   Orthopedics was consulted, patient underwent Open treatment of left femoral fracture, proximal end, neck with prosthetic replacement 9/28.   Recommends bear weight as tolerated on their operative extremity with posterior hip precautions.     Interval Problem Update  10/1/2024  Seen and examined at bedside  Vital remained stable  Labs reviewed normal white count, hemoglobin 8.7, chemistry sodium 134 renal function stable  Chart reviewed, consult note reviewed  Continue on Tylenol, requiring IV narcotics for pain management, monitor for toxicity  Case discussed with orthopedics MARNIE mendiola .  Case discussed with rehab coordinator Kristina .  Waiting for insurance Auth    I have discussed this patient's plan of care and discharge plan at IDT rounds today with Case Management, Nursing, Nursing leadership, and other members of the IDT team.    Consultants/Specialty  orthopedics    Code Status  Full Code    Disposition  The patient is not medically cleared for discharge to home or a post-acute facility.  Anticipate discharge to: an inpatient rehabilitation hospital    I have placed the appropriate orders for post-discharge needs.    Review of Systems  Review of Systems    Musculoskeletal:  Positive for joint pain.        Physical Exam  Temp:  [36.5 °C (97.7 °F)-37.1 °C (98.8 °F)] 36.9 °C (98.4 °F)  Pulse:  [77-90] 90  Resp:  [16-17] 17  BP: (105-122)/(55-64) 117/64  SpO2:  [93 %-98 %] 98 %    Physical Exam  Cardiovascular:      Rate and Rhythm: Normal rate and regular rhythm.      Pulses: Normal pulses.   Pulmonary:      Effort: Pulmonary effort is normal.      Breath sounds: Normal breath sounds.   Musculoskeletal:      Comments: Range of motion limited in left lower extremity due to pain   Neurological:      General: No focal deficit present.      Mental Status: She is alert and oriented to person, place, and time.   Psychiatric:         Mood and Affect: Mood normal.         Fluids    Intake/Output Summary (Last 24 hours) at 10/1/2024 1522  Last data filed at 10/1/2024 1020  Gross per 24 hour   Intake --   Output 200 ml   Net -200 ml        Laboratory  Recent Labs     09/29/24  0906 09/30/24  0621 10/01/24  0409   WBC 5.6 5.7 6.3   RBC 2.97* 2.71* 2.64*   HEMOGLOBIN 9.7* 8.8* 8.7*   HEMATOCRIT 27.4* 25.6* 24.8*   MCV 92.3 94.5 93.9   MCH 32.7 32.5 33.0   MCHC 35.4 34.4 35.1   RDW 41.1 42.5 42.7   PLATELETCT 262 280 286   MPV 9.5 10.1 9.5     Recent Labs     09/29/24  0906 09/30/24  0621 10/01/24  0409   SODIUM 134* 132* 134*   POTASSIUM 3.9 3.9 4.6   CHLORIDE 101 97 100   CO2 24 25 25   GLUCOSE 119* 123* 121*   BUN 13 10 13   CREATININE 0.74 0.68 0.58   CALCIUM 8.8 8.9 9.3                   Imaging  No orders to display        Assessment/Plan  * Closed left hip fracture, initial encounter (HCC)- (present on admission)  Assessment & Plan  -Inpatient status: Medical Floor  -NPO at 12 am: OK to have a light meal before 12 am.   -[facility]: Acute subcapital femoral neck fracture. Images were uploaded.  -I appreciate Orthopedic Consult and recommendations. I personally discussed this case with Dr. Schroeder. Plan for OR in am  -Pain control with IV narcotics    Advance care  planning  Assessment & Plan  Patient is 73-year-old female, admitted with displaced left femoral neck fracture, requiring surgical interventions, PT/OT/PMR.  I discussed goal of care and CODE STATUS with patient at bedside.  Patient has a medical decision capacity.  The patient confirms DNR/DNI. ACP 16 mins    Left displaced femoral neck fracture (HCC)- (present on admission)  Assessment & Plan  CT pelvis outside hospital showing Left Subcapital fracture.   Orthopedics was consulted, patient underwent Open treatment of left femoral fracture, proximal end, neck with prosthetic replacement 9/28.   Recommends bear weight as tolerated on their operative extremity with posterior hip precautions.   PT/OT/PMR  Multimodal pain managements including po and iv narcotics prn. Monitoring respiratory status and sedation score      Hyponatremia  Assessment & Plan  Mild  Continue monitoring   Free water restriction           VTE prophylaxis: lovennox    I have performed a physical exam and reviewed and updated ROS and Plan today (10/1/2024). In review of yesterday's note (9/30/2024), there are no changes except as documented above.         Greater than 52 minutes spent preparing to see patient (e.g. review of tests) obtaining and/or reviewing separately obtained history. Performing a medically appropriate examination and/ evaluation.  Counseling and educating the patient/family/caregiver.  Ordering medications, tests, or procedures.  Referring and communicating with other health care professionals.  Documenting clinical information in EPIC.  Independently interpreting results and communicating results to patient/family/caregiver.  Care coordination.

## 2024-10-01 NOTE — PROGRESS NOTES
"      Orthopaedic Progress Note    Interval changes:  Patient doing well    L ismael dressings are CDI  Cleared for DC to rehab by ortho pending medicine clearance    ROS - Patient denies any new issues.  Pain well controlled.    /64   Pulse 90   Temp 36.9 °C (98.4 °F) (Temporal)   Resp 17   Ht 1.727 m (5' 8\")   Wt 68.1 kg (150 lb 2.1 oz)   SpO2 98%     Patient seen and examined  No acute distress  Breathing non labored  RRR  L ismael dressings CDI, DNVI, moves all toes, cap refill <2 sec.    Recent Labs     09/29/24  0906 09/30/24  0621 10/01/24  0409   WBC 5.6 5.7 6.3   RBC 2.97* 2.71* 2.64*   HEMOGLOBIN 9.7* 8.8* 8.7*   HEMATOCRIT 27.4* 25.6* 24.8*   MCV 92.3 94.5 93.9   MCH 32.7 32.5 33.0   MCHC 35.4 34.4 35.1   RDW 41.1 42.5 42.7   PLATELETCT 262 280 286   MPV 9.5 10.1 9.5       Active Hospital Problems    Diagnosis     Advance care planning [Z71.89]     Closed left hip fracture, initial encounter ([facility]) [S72.002A]     Preop cardiovascular exam [Z01.810]     Hyponatremia [E87.1]     Left displaced femoral neck fracture ([facility]) [S72.002A]        Assessment/Plan:  Patient doing well    L ismael dressings are CDI  Cleared for DC to rehab by ortho pending medicine clearance  POD#3 S/P Open treatment of left femoral fracture, proximal end, neck with prosthetic replacement   Wt bearing status - WBAT with posterior hip precautions  Wound care/Drains - Dressings to be left in place  Future Procedures - none   Lovenox: Start 9/28, Duration-until ambulatory > 150'  Sutures/Staples out- 14-21 days post operatively. Removal will completed by ortho mid levels only.  PT/OT-initiated  Antibiotics: Perioperative completed  DVT Prophylaxis- TEDS/SCDs/Foot pumps  Alvarado-not needed per ortho  Case Coordination for Discharge Planning - Disposition per therapy recs.     "

## 2024-10-01 NOTE — CARE PLAN
Problem: Knowledge Deficit - Standard  Goal: Patient and family/care givers will demonstrate understanding of plan of care, disease process/condition, diagnostic tests and medications  Description: Target End Date:  1-3 days or as soon as patient condition allows    Document in Patient Education    1.  Patient and family/caregiver oriented to unit, equipment, visitation policy and means for communicating concern  2.  Complete/review Learning Assessment  3.  Assess knowledge level of disease process/condition, treatment plan, diagnostic tests and medications  4.  Explain disease process/condition, treatment plan, diagnostic tests and medications  Outcome: Met     Problem: Pain - Standard  Goal: Alleviation of pain or a reduction in pain to the patient’s comfort goal  Description: Target End Date:  Prior to discharge or change in level of care    Document on Vitals flowsheet    1.  Document pain using the appropriate pain scale per order or unit policy  2.  Educate and implement non-pharmacologic comfort measures (i.e. relaxation, distraction, massage, cold/heat therapy, etc.)  3.  Pain management medications as ordered  4.  Reassess pain after pain med administration per policy  5.  If opiods administered assess patient's response to pain medication is appropriate per POSS sedation scale  6.  Follow pain management plan developed in collaboration with patient and interdisciplinary team (including palliative care or pain specialists if applicable)  Outcome: Progressing     Problem: Skin Integrity  Goal: Skin integrity is maintained or improved  Description: Target End Date:  Prior to discharge or change in level of care    Document interventions on Skin Risk/Caio flowsheet groups and corresponding LDA    1.  Assess and monitor skin integrity, appearance and/or temperature  2.  Assess risk factors for impaired skin integrity and/or pressures ulcers  3.  Implement precautions to protect skin integrity in  collaboration with interdisciplinary team  4.  Implement pressure ulcer prevention protocol if at risk for skin breakdown  5.  Confirm wound care consult if at risk for skin breakdown  6.  Ensure patient use of pressure relieving devices  (Low air loss bed, waffle overlay, heel protectors, ROHO cushion, etc)  Outcome: Met     Problem: Fall Risk  Goal: Patient will remain free from falls  Description: Target End Date:  Prior to discharge or change in level of care    Document interventions on the [facility] Fall Risk Assessment    1.  Assess for fall risk factors  2.  Implement fall precautions  Outcome: Met   The patient is Stable - Low risk of patient condition declining or worsening    Shift Goals  Clinical Goals: Pain control, safe mobility  Patient Goals: Pain control, rest,comfort  Family Goals: N/A    Progress made toward(s) clinical / shift goals:      Pt. Vital signs WDL.   Pt. Has no new skin integrity issue/ impairment.   Pt. Verbalized understanding on the care provided.   Pt. Rated pain as 7  from 1-10, 10 being the most painful. Pain medications given as ordered.   Pt. Didn't fall under RN care. Fall precautions in place.

## 2024-10-01 NOTE — PROGRESS NOTES
Physical Medicine and Rehabilitation         Date of initial consultation: 9/29/2024  LOS: 4 Day(s)    Chief complaint: left hip pain after GLF     HPI: The patient is a 73 y.o.  female with no known pmhx;  who presented on 9/27/2024  5:08 PM  [facility] GLF on 9/13. Per documentation, patient sustained a MGLF landing on her left side. [facility] obtained were negative for acute fracture,patint was then discharged home with SPC. Patient's pain continued to worsen over the next 7-10 days and [facility] pelvis was obtained that showed a left subcapital fracture and patient was transferred to [facility] for ortho evaluation.   Upon eval at [facility], ortho was consulted and patient was taken to the OR on 9/28 for open treatment of the left femoral neck fracture performed by Dr. Schroeder. Patient is WBAT LLE with posterior hip precautions. Patient's hospital course has been notable for hypotension and ABLA.     Patient seen and examined at bedside with brother and friend in room. Patient reports she feels ok, pain is controlled at rest. Reports feeling a little lightheaded when up with therapy. Does not report HA,  SOB, CP, abdominal pain, or changes in numbness/tingling/weakness.     10/1/2024  Insurance auth from [facility] is pending. Hgb is significantly worse at 8.7. Sodium stable at 134. She is improving from a functional standpoint but still wants to come to [facility] because she lives alone. She will DC with friends initially but will then transition to living alone.        Social Hx:  Patient lives alone in a 1 story house with 2 CHARLEY. Has support from friends and brother who may be able to help intermittently    2 CHARLEY  At prior level of function patient was Independent with mobility and ADLs. JACKELIN I with      Tobacco: Denied  Alcohol: Denied  Drugs: Denied     THERAPY:  Restrictions: Fall Risk, posterior hip  precautions, WBAT LLE   PT: Functional mobility   9/29  Min A sit to stand, Min A transfers, unable to tolerate gait due to hypotension   9/30: Walking 50' x2 with FWW at [facility]    OT: ADLs  9/29 Min A transfers, Min A sit to stand, Mod A lower body dressing     SLP:   None     IMAGING:  DX-WRIST-COMPLETE 3+ LEFT  Narrative: HISTORY/REASON FOR EXAM:  Pain/Deformity Following Trauma.    TECHNIQUE/EXAM DESCRIPTION AND NUMBER OF VIEWS: LEFT wrist, 3 views, 2/6/2018 1:37 PM.    COMPARISON: None.    FINDINGS:  There is no evidence of fracture or dislocation.    Joint Spaces: Normal.    Benign subchondral cyst within the mid body of the scaphoid    Subchondral cyst involving the lunate    There is degenerative changes of the articulation with the lunate and triquetrum.    Small bony excrescence from the distal shaft of the fifth metacarpal measuring approximately 2 mm.  Impression: Degenerative changes. No fracture.        PROCEDURES:  9/28 Open treatment of left femoral fracture, proximal end, neck with prosthetic replacement by Dr. Schroeder     PMH:  Past Medical History:   Diagnosis Date    No known health problems        PSH:  Past Surgical History:   Procedure Laterality Date    PB PARTIAL HIP REPLACEMENT Left 9/28/2024    Procedure: HEMIARTHROPLASTY, HIP;  Surgeon: David Schroeder M.D.;  Location: SURGERY [facility];  Service: Orthopedics    ABDOMINAL HYSTERECTOMY TOTAL      APPENDECTOMY         FHX:  History reviewed. No pertinent family history.    Medications:  Current Facility-Administered Medications   Medication Dose    enoxaparin (Lovenox) inj 40 mg  40 mg    Pharmacy Consult Request ...Pain Management Review 1 Each  1 Each    ondansetron (Zofran) syringe/vial injection 4 mg  4 mg    dexamethasone (Decadron) injection 4 mg  4 mg    diphenhydrAMINE (Benadryl) injection 25 mg  25 mg    haloperidol lactate (Haldol) injection 1 mg  1 mg    scopolamine (Transderm-Scop) patch 1 Patch  1 Patch    docusate sodium  "(Colace) capsule 100 mg  100 mg    senna-docusate (Pericolace Or Senokot S) 8.6-50 MG per tablet 1 Tablet  1 Tablet    senna-docusate (Pericolace Or Senokot S) 8.6-50 MG per tablet 1 Tablet  1 Tablet    polyethylene glycol/lytes (Miralax) Packet 1 Packet  1 Packet    magnesium hydroxide (Milk Of Magnesia) suspension 30 mL  30 mL    bisacodyl (Dulcolax) suppository 10 mg  10 mg    sodium phosphate enema 1 Each  1 Each    acetaminophen (Tylenol) tablet 650 mg  650 mg    Followed by    [START ON 10/3/2024] acetaminophen (Tylenol) tablet 650 mg  650 mg    oxyCODONE immediate-release (Roxicodone) tablet 5 mg  5 mg    Or    oxyCODONE immediate release (Roxicodone) tablet 10 mg  10 mg    Or    HYDROmorphone (Dilaudid) injection 0.5 mg  0.5 mg    ondansetron (Zofran ODT) dispertab 4 mg  4 mg       Allergies:  Allergies   Allergen Reactions    Morphine Rash     Per pt all over chest    Codeine     Penicillins          Physical Exam:  Vitals: /64   Pulse 90   Temp 36.9 °C (98.4 °F) (Temporal)   Resp 17   Ht 1.727 m (5' 8\")   Wt 68.1 kg (150 lb 2.1 oz)   SpO2 98%   Gen: NAD, laying comfortably in bed, friends in room   Head:  NC/AT  Eyes/ Nose/ Mouth: PERRLA, moist mucous membranes  Cardio: RRR, good distal perfusion, warm extremities  Pulm: normal respiratory effort, no cyanosis, on RA   Abd: Soft NTND  Ext: No peripheral edema. No calf tenderness. No clubbing.    Mental status:  A&Ox4 (person, place, date, situation) answers questions appropriately follows commands  Speech: fluent, no aphasia or dysarthria    Labs: Reviewed and significant for   Recent Labs     09/29/24 0906 09/30/24  0621 10/01/24  0409   RBC 2.97* 2.71* 2.64*   HEMOGLOBIN 9.7* 8.8* 8.7*   HEMATOCRIT 27.4* 25.6* 24.8*   PLATELETCT 262 280 286     Recent Labs     09/29/24 0906 09/30/24  0621 10/01/24  0409   SODIUM 134* 132* 134*   POTASSIUM 3.9 3.9 4.6   CHLORIDE 101 97 100   CO2 24 25 25   GLUCOSE 119* 123* 121*   BUN 13 10 13   CREATININE " 0.74 0.68 0.58   CALCIUM 8.8 8.9 9.3     Recent Results (from the past 24 hour(s))   CBC WITHOUT DIFFERENTIAL    Collection Time: 10/01/24  4:09 AM   Result Value Ref Range    WBC 6.3 4.8 - 10.8 K/uL    RBC 2.64 (L) 4.20 - 5.40 M/uL    Hemoglobin 8.7 (L) 12.0 - 16.0 g/dL    Hematocrit 24.8 (L) 37.0 - 47.0 %    MCV 93.9 81.4 - 97.8 fL    MCH 33.0 27.0 - 33.0 pg    MCHC 35.1 32.2 - 35.5 g/dL    RDW 42.7 35.9 - 50.0 fL    Platelet Count 286 164 - 446 K/uL    MPV 9.5 9.0 - 12.9 fL   Basic Metabolic Panel    Collection Time: 10/01/24  4:09 AM   Result Value Ref Range    Sodium 134 (L) 135 - 145 mmol/L    Potassium 4.6 3.6 - 5.5 mmol/L    Chloride 100 96 - 112 mmol/L    Co2 25 20 - 33 mmol/L    Glucose 121 (H) 65 - 99 mg/dL    Bun 13 8 - 22 mg/dL    Creatinine 0.58 0.50 - 1.40 mg/dL    Calcium 9.3 8.5 - 10.5 mg/dL    Anion Gap 9.0 7.0 - 16.0   ESTIMATED GFR    Collection Time: 10/01/24  4:09 AM   Result Value Ref Range    GFR (CKD-EPI) 95 >60 mL/min/1.73 m 2         ASSESSMENT:  Patient is a 73 y.o. female admitted with left hip fracture     [facility] Code / Diagnosis to Support: 0008.11 - Orthopaedic Disorders: Status Post Unilateral Hip Fracture  See DISPO details below for recommendations on appropriate level of rehab for this diagnosis.    Barriers to transfer include: Insurance authorization, TCCs to verify disposition, medical clearance and bed availability     Assessment and Plan:  Left hip fracture   - sustained in GLF on 9/13   - [facility]   - transferred to [facility], ortho consulted   - 9/28 ORIF of the left femoral neck fracture with prosthetic replacement by Dr. Schroeder   - WBAT LLE , posterior hip precautions   - continue with PT/OT     ABLA (worsening)  -post op drop in Hgb   - 9/29 Hgb 9.7 >> 8.7  - primary team monitoring CBC     Hypotension  - was unable to tolerate gait due to hypotension   - not currently on antihypertensives   - GRACIELA hose ordered for when OOB      Hyponatremia   - post op drop in Na   - 9/29 Na 134 >> 134 (stable)  - primary team monitoring Cr       DISPO:  - patient is currently functioning below their level of baseline, recommend post acute rehab  - recommend IRF level therapy with 3hr of therapy 5 days per week   - prior to acceptance to IRF, will need insurance auth from [facility]   - [facility] to assist with insurance auth and confirmation of  DC support from friends        Medical Complexity:  Left hip fracture   ABLA   Hyponatremia   Impaired mobility and ADLs       DVT PPX: Lovenox       Thank you for allowing us to participate in the care of this patient.     Patient was seen for >35 minutes on unit/floor of which > 50% of time was spent on counseling and coordination of care regarding the above, including prognosis, risk reduction, benefits of treatment, and options for next stage of care.    Lenin Hager D.O.   Physical Medicine and Rehabilitation     Please note that this dictation was created using voice recognition software. I have made every reasonable attempt to correct obvious errors, but there may be errors of grammar and possibly content that I did not discover before finalizing the note.

## 2024-10-01 NOTE — DISCHARGE PLANNING
Pending [facility] authorization for [facility].   IPR TCC states that Cady with the [facility] is reviewing case this morning.   Hopeful for authorization for IPR today. IDT notified of updates.     1305: Writer called Cady with the [facility]: [phone] and left voicemail requesting call back regarding status of authorization.     1505: Attempted to contact Cady with the [facility] again. No answer.   Avoidable days updated in Epic.     Case Management Discharge Planning    Admission Date: 9/27/2024  GMLOS: 4.1  ALOS: 4    6-Clicks ADL Score: 19  6-Clicks Mobility Score: 17  PT and/or OT Eval ordered: Yes  Post-acute Referrals Ordered: Yes  Post-acute Choice Obtained: Yes  Has referral(s) been sent to post-acute provider:  Yes      Anticipated Discharge Dispo: Discharge Disposition: Disch to  rehab facility or distinct part unit (62)  Discharge Address: [address] [place] [id number]  Discharge Contact Phone Number: [phone]    DME Needed: No    Action(s) Taken: Updated Provider/Nurse on Discharge Plan    Escalations Completed: Pending Discharge Destination and Insurance     Medically Clear: Yes    Next Steps: Pending [facility] authorization for [facility].     Barriers to Discharge: Pending Insurance Authorization    Is the patient up for discharge tomorrow: Hopeful for today, 10/1/2024, via GMT WC.

## 2024-10-01 NOTE — PREADMISSION SCREENING NOTE
"  Pre-Admission Screening Form    Patient Information:   Name: Ashley Gillespie     MRN: [id number]       : 1950      Age: 73 y.o.   Gender: female      Race: White [7]       Marital Status: Single [1]  Family Contact: Rudolph Gillespie \"Juni\"        Relationship: Brother [1]  Home Phone:            Cell Phone: [phone]  Advanced Directives: None  Code Status:  FULL  Current Attending Provider: Dario Garsia M.D.  Referring Physician: Dr. Schroeder      Physiatrist Consult: Dr. Ashby       Referral Date: 24  Primary Payor Source:  [facility]  Secondary Payor Source:  MEDICARE    Medical Information:   Date of Admission to Acute Care [place]2024  Room Number: T334/02  Rehabilitation Diagnosis: 0008.11 - Orthopaedic Disorders: Status Post Unilateral Hip Fracture    There is no immunization history on file for this patient.  Allergies   Allergen Reactions    Morphine Rash     Per pt all over chest    Codeine     Penicillins      Past Medical History:   Diagnosis Date    No known health problems      Past Surgical History:   Procedure Laterality Date    PB PARTIAL HIP REPLACEMENT Left 2024    Procedure: HEMIARTHROPLASTY, HIP;  Surgeon: David Schroeder M.D.;  Location: SURGERY [facility];  Service: Orthopedics    ABDOMINAL HYSTERECTOMY TOTAL      APPENDECTOMY         History Leading to Admission, Conditions that Caused the Need for Rehab (CMS):     Crissy Stafford M.D.  Physician  [facility] Medicine     H&P      Signed     Date of Service: 2024  5:33 PM    Expand All Collapse All    Hospital Medicine History & Physical Note     Date of Service  2024     Primary Care Physician  JC Nelson D.O.     Consultants  orthopedics     Specialist Names: I discussed this case with Dr. Schroeder     Code Status  Full Code     Chief [facility]     History of Presenting Illness  Ashley Gillespie is a 73 y.o. female " who [facility] Fracture on 9/27/2024. Patient fell on 9/13. She lost balance when opening the door for her Dog. [facility], and no fracture was found, however pain is getting progressively worse. She purchased a cane to help with ambulation but [facility] CT pelvis was done showing Left Subcapital fracture. She is transferred to [facility] because there are no [facility] Sur[facility] available [facility] the [facility] until Monday to perform procedure.      I discussed the plan of care with patient and Orthopedic Surgeon Dr. Schroedre .     Review of Systems  Review of Systems   Constitutional:  Negative for fever.   HENT:  Negative for congestion and sore throat.    Eyes:  Negative for blurred vision and double vision.   Respiratory:  Negative for cough and shortness of breath.    Cardiovascular:  Negative for chest pain and palpitations.   Gastrointestinal:  Negative for nausea and vomiting.   Genitourinary:  Negative for dysuria and urgency.   Musculoskeletal:  Positive for falls and joint pain. Negative for myalgias and neck pain.   Skin:  Negative for itching and rash.   Neurological:  Negative for dizziness, weakness and headaches.   Endo/Heme/Allergies:  Does not bruise/bleed easily.   Psychiatric/Behavioral:  Negative for depression. The patient does not have insomnia.          Past Medical History   has a past medical history of No known health problems.     Surgical History   has a past surgical history that includes abdominal hysterectomy total and appendectomy.      Family History  Reviewed and not pertinent  Family history reviewed with patient. There is no family history that is pertinent to the chief complaint.      Social History   reports that she has never smoked. She has never used smokeless tobacco.     Allergies    Allergies  Allergies  Allergen Reactions   Morphine Rash      Per pt all over  chest   Codeine     Penicillins            Medications    None        Physical Exam  Temp:  [36.3 °C (97.3 °F)] 36.3 °C (97.3 °F)  Pulse:  [59] 59  Resp:  [18] 18  BP: (140)/(67) 140/67  SpO2:  [96 %] 96 %        Physical Exam  Constitutional:       Appearance: Normal appearance.   HENT:      Head: Normocephalic and atraumatic.      Mouth/Throat:      Mouth: Mucous membranes are moist.      Pharynx: Oropharynx is clear.   Eyes:      Extraocular Movements: Extraocular movements intact.      Pupils: Pupils are equal, round, and reactive to light.   Cardiovascular:      Rate and Rhythm: Normal rate and regular rhythm.      Heart sounds: Normal heart sounds.   Pulmonary:      Effort: Pulmonary effort is normal.      Breath sounds: Normal breath sounds.   Abdominal:      General: Abdomen is flat. Bowel sounds are normal.      Palpations: Abdomen is soft.   Musculoskeletal:      Cervical back: Normal range of motion and neck supple.      Comments: Left leg: pain with ROM   Skin:     General: Skin is warm and dry.   Neurological:      General: No focal deficit present.      Mental Status: She is alert and oriented to person, place, and time.   Psychiatric:         Mood and Affect: Mood normal.         Behavior: Behavior normal.            Laboratory:          Outside Facility Laboratory:  CBC: WBC 7.72, Hgb 14.3, Platelets 379  CMP: Na 135, K 3.7, Chloride 102, CO2 25, Glucose 103, Creatinine 0.8, BUN 14, Calcium 9.5, Alk Phos 80, T Bili 0.8, AST 16, ALT 9        Imaging:  Outside Facility Imaging:  CT Pelvis WO:  IMPRESSION: Acute subcapital femoral neck fracture              Outside Facility EKG (My personal review and interpretation)  NSR at 62 bpm, no acute ST elevation        I provided extensive external medical records review     Assessment/Plan:  Justification for Admission Status  I anticipate this patient will require at least two midnights for appropriate medical management, necessitating inpatient admission  [facility]           * Closed left hip fracture, initial encounter ([facility])- (present on admission)  Assessment & Plan  -Inpatient status: Medical Floor  -NPO at 12 am: OK to have a light meal before 12 am.   -[facility]vis [facility] the [facility]: Acute subcapital femoral neck fracture. Images were uploaded.  -I appreciate Orthopedic Consult and recommendations. I personally discussed this case with Dr. Schroeder. Plan for OR in am  -Pain control with IV narcotics     Preop cardiovascular exam  Assessment & Plan  Cardiovascular:   Patient does not have history of CHF  Pre-op EKG: Yes     Pulmonary:  Oxygen per protocol     GI:   No history of cirrhosis. Standard bowel regimen. Hold for loose stools.     Renal:   IV fluids: -150 mL/hr for 2 days   Labs: Metabolic Panel with AM labs     Musculoskeletal:   Check 25 OH vitamin D level. If 31-40 pg/mL, consider starting vitamin D3 1000 IU PO daily. If 20-30 pg/mL, consider vitamin D3 2000 IU PO daily. If <20 pg/mL, vitamin D2 50,000 IU weekly x 8 weeks then 2000 IU PO daily.     Neurologic:   Pain Control: Neuro checks every 4 hours  Avoid fentanyl (short-acting)  Acetaminophen 1000 mg PO TID; 650 mg PO TID if liver problems     Hematologic:  ? Plan on pharmacologic DVT prophylaxis post operative day #1. Hold for decreasing hemoglobin. Notify provider for hemoglobin less than 8.  ? Order preoperative type and cross.   ? If patient was on anticoagulation prior to arrival risks and benefits will be weighed by teams including surgery, hospitalist, geriatrics, and anesthesia for delaying surgery more than 24 hours.   ? On anticoagulation prior to arrival: No     Medical Assessment Risk:  Intermediate     Surgical Risk:   Intermediate     No prior PMH, she is medically optimized for Surgical procedure without additional workup        Hyponatremia  Assessment & Plan  Na at outside facility was 135. This is likely reactive. Monitor  chemistry panel in am           VTE prophylaxis: SCDs/TEDs        David Schroeder M.D.  Physician  Surgery Orthopedic     OP Report      Signed     Date of Service: 9/28/2024  7:37 AM      DATE OF OPERATION: 9/28/2024     PREOPERATIVE DIAGNOSIS: Left displaced femoral neck fracture     POSTOPERATIVE DIAGNOSIS: Same     PROCEDURE PERFORMED: Open treatment of left femoral fracture, proximal end, neck with prosthetic replacement     SURGEON: David Schroeder M.D.      ASSISTANT: None     ANESTHESIA: General     ESTIMATED BLOOD LOSS: 50 mL     INDICATIONS: The patient is a 73 y.o. female with a left femoral neck fracture resulting from a ground level fall.  The patient denies antecedent pain, and was found to have a normal neurovascular exam and skin envelope.  Radiographs reviewed by myself demonstrated a displaced femoral neck fracture.  Given these findings, the patient is a candidate for surgical treatment of the femoral neck fracture with hemiarthroplasty.  I discussed the risks and benefits of the procedure, including the risks of infection, wound healing complication, neurovascular injury, instability, limb length discrepancy, need for revision surgery, and the medical risks of anesthesia including DVT, PE, MI, stroke, and death.  Benefits include early mobilization and reduction in the medical risks of hip fractures.  Alternatives to surgery were also discussed, including non-operative management, percutaneous screw fixation and total hip arthroplasty.  The patient was in agreement with the plan to proceed, the informed consent was signed and documented and the operative extremity was marked.       PROCEDURE:  The patient was sedated with general anesthesia, and positioned in the lateral decubitus position on a beanbag with all bony prominences well padded and an axillary roll placed.  Perioperative antibiotics were administered. Sequential compression devices were employed.  The correct operative site was  prepped and draped into a sterile field.  A procedural pause was conducted to verify correct patient, correct extremity, and presence of the surgeons initials on the operative extremity.     A posterior approach to the hip was performed with care taken to avoid all neurovascular structures.  The fascia was split in line with the incision to the tip of the greater troch, then curved posteriorly to parallel the gluteal fibers.  The short external rotators and capsule were taken down en bloc and tagged with No 5 Ticron suture for future repair. The labrum was preserved and the sciatic nerve was protected at all times.     A femoral neck cut was made one finger breadth above the lesser trochanter and the femoral head was removed without difficulty.  The acetabulum was inspected and cleared of foreign material.  A femoral neck retractor was placed, and the canal was sequentially reamed and broached to a size 6.  After preparation of the canal, a cement restrictor was placed. A [employer] Ion size 6 stem was cemented in the appropriate version using third generation cement techniques.     Once cement had dried completely a 47mm shell and 26+0mm head were impacted. The hip was then reduced and stability was tested. Hip was stable in full extension and external rotation and stable to 90 degrees internal rotation at 90 degrees forward flexion. Wounds were irrigated and capsule was closed to greater trochanter with #5 Ticron sutures.  The wound was then closed in layers, with #1 Vicryl in the fascia, 2-0 vicryl in the subcutaneous tissue, and staples in the skin.  Sterile dressings were applied, and the patient was transferred to PACU in stable condition.     The patient tolerated the procedure well. There were no apparent complications. All sponge, needle, and instrument counts were correct on two separate occasions. She was awakened, extubated, and transferred to the recovery room in satisfactory condition.             Post-Operative Plan:     1.  The patient should bear weight as tolerated on their operative extremity with posterior hip precautions.  Gait aids (crutch or crutches, cane, walker) may be used as needed, and may be discontinued when no longer required.  2.  IV antibiotics - may be continued for 24 hours  3.  DVT prophylaxis - SCD's and Lovenox 40 mg SQ daily while inpatient.  The patient may transition to Aspirin 325 mg PO BID as an outpatient  4.  Discharge planning  ____________________________________   David Schroeder M.D.   DD: 9/28/2024  8:32 AM            Lydia Ashby D.O.  Physician  Physical Medicine & Rehab     Consults      Addendum     Date of Service: 9/29/2024 12:04 PM  Consult Orders  IP Consult For Physiatry [[id number]] ordered by David Schroeder M.D. at 09/30/24 0710       Addendum     Expand All Collapse All                                                    Physical Medicine and Rehabilitation Consultation                                                                                  Date of initial consultation: 9/29/2024  Requesting provider: ordered by David Schroeder M.D. at 09/30/24 0710   Consulting provider: Lydia Ashby D.O.  Reason for consultation: assess for acute inpatient rehab appropriateness  LOS: 2 Day(s)     Chief complaint: left hip pain after GLF      HPI: The patient is a 73 y.o.  female with no known pmhx;  who presented on 9/27/2024  5:08 PM  [facility] after a GLF on 9/13. Per documentation, patient sustained a MGLF landing on her left side. [facility] where images obtained were negative for acute fracture,patint was then discharged home with SPC. Patient's pain continued to worsen over the next 7-10 days and patient returned [facility] CT pelvis was obtained that showed a left subcapital fracture and patient was transferred to [facility] for ortho evaluation.   Upon eval at [facility], ortho  was consulted and patient was taken to the OR on 9/28 for open treatment of the left femoral neck fracture performed by Dr. Schroeder. Patient is WBAT LLE with posterior hip precautions. Patient's hospital course has been notable for hypotension and ABLA.      Patient seen and examined at bedside with brother and friend in room. Patient reports she feels ok, pain is controlled at rest. Reports feeling a little lightheaded when up with therapy. Does not report HA,  SOB, CP, abdominal pain, or changes in numbness/tingling/weakness.         Social Hx:  Patient lives alone in a 1 story house with 2 CHARLEY. Has support from friends and brother who may be able to help intermittently    2 CHARLEY  At prior level of function patient was Independent with mobility and ADLs. MDO I with SPC      Tobacco: Denied  Alcohol: Denied  Drugs: Denied      THERAPY:  Restrictions: Fall Risk, posterior hip precautions, WBAT LLE   PT: Functional mobility   9/29  Min A sit to stand, Min A transfers, unable to tolerate gait due to hypotension      OT: ADLs  9/29 Min A transfers, Min A sit to stand, Mod A lower body dressing      SLP:   None      IMAGING:  DX-WRIST-COMPLETE 3+ LEFT  Narrative: HISTORY/REASON FOR EXAM:  Pain/Deformity Following Trauma.     TECHNIQUE/EXAM DESCRIPTION AND NUMBER OF VIEWS: LEFT wrist, 3 views, 2/6/2018 1:37 PM.     COMPARISON: None.     FINDINGS:  There is no evidence of fracture or dislocation.     Joint Spaces: Normal.     Benign subchondral cyst within the mid body of the scaphoid     Subchondral cyst involving the lunate     There is degenerative changes of the articulation with the lunate and triquetrum.     Small bony excrescence from the distal shaft of the fifth metacarpal measuring approximately 2 mm.  Impression: Degenerative changes. No fracture.           PROCEDURES:  9/28 Open treatment of left femoral fracture, proximal end, neck with prosthetic replacement by Dr. Schroeder      PMH:    Past Medical  "History  Past Medical History:  Diagnosis Date   No known health problems            PSH:    Past Surgical History  Past Surgical History:  Procedure Laterality Date   ABDOMINAL HYSTERECTOMY TOTAL       APPENDECTOMY              FHX:    Family History  History reviewed. No pertinent family history.       Medications:    Current Facility-Administered Medications  Medication Dose   enoxaparin (Lovenox) inj 40 mg  40 mg   Pharmacy Consult Request ...Pain Management Review 1 Each  1 Each   ondansetron (Zofran) syringe/vial injection 4 mg  4 mg   dexamethasone (Decadron) injection 4 mg  4 mg   diphenhydrAMINE (Benadryl) injection 25 mg  25 mg   haloperidol lactate (Haldol) injection 1 mg  1 mg   scopolamine (Transderm-Scop) patch 1 Patch  1 Patch   docusate sodium (Colace) capsule 100 mg  100 mg   senna-docusate (Pericolace Or Senokot S) 8.6-50 MG per tablet 1 Tablet  1 Tablet   senna-docusate (Pericolace Or Senokot S) 8.6-50 MG per tablet 1 Tablet  1 Tablet   polyethylene glycol/lytes (Miralax) Packet 1 Packet  1 Packet   magnesium hydroxide (Milk Of Magnesia) suspension 30 mL  30 mL   bisacodyl (Dulcolax) suppository 10 mg  10 mg   sodium phosphate enema 1 Each  1 Each   acetaminophen (Tylenol) tablet 650 mg  650 mg    Followed by   [START ON 10/3/2024] acetaminophen (Tylenol) tablet 650 mg  650 mg   oxyCODONE immediate-release (Roxicodone) tablet 5 mg  5 mg    Or   oxyCODONE immediate release (Roxicodone) tablet 10 mg  10 mg    Or   HYDROmorphone (Dilaudid) injection 0.5 mg  0.5 mg   ondansetron (Zofran ODT) dispertab 4 mg  4 mg   NS infusion          Allergies:    Allergies  Allergies  Allergen Reactions   Morphine Rash      Per pt all over chest   Codeine     Penicillins               Physical Exam:  Vitals: /65   Pulse 74   Temp 37 °C (98.6 °F) (Temporal)   Resp 16   Ht 1.727 m (5' 8\")   Wt 68.1 kg (150 lb 2.1 oz)   SpO2 98%   Gen: NAD, laying comfortably in bed, friends in room   Head:  NC/AT  Eyes/ " Nose/ Mouth: PERRLA, moist mucous membranes  Cardio: RRR, good distal perfusion, warm extremities  Pulm: normal respiratory effort, no cyanosis, on RA   Abd: Soft NTND  Ext: No peripheral edema. No calf tenderness. No clubbing.     Mental status:  A&Ox4 (person, place, date, situation) answers questions appropriately follows commands  Speech: fluent, no aphasia or dysarthria     CRANIAL NERVES:  2,3: visual acuity grossly intact, PERRL  3,4,6: EOMI bilaterally, no nystagmus or diplopia  5: sensation intact to light touch bilaterally and symmetric  7: no facial asymmetry  8: hearing grossly intact     Motor:                              Upper Extremity  Myotome R L  Shoulder flexion C5 5/5 5/5  Elbow flexion C5 5/5 5/5  Wrist extension C6 5/5 5/5  Elbow extension C7 5/5 5/5  Finger flexion C8 5/5 5/5  Finger abduction T1 5/5 5/5       Lower Extremity Myotome R L  Hip flexion L2 5/5 2/5  Knee extension L3 5/5 2/5  Ankle dorsiflexion L4 5/5 4/5  Toe extension L5 5/5 5/5  Ankle plantarflexion S1 5/5 5/5        Sensory:   intact to light touch through out  DTRs: 2+ in bilateral  biceps  No clonus at bilateral ankles  Negative Cortez b/l      Tone: no spasticity noted     Coordination:   intact finger to nose bilaterally  intact fine motor with fingers bilaterally        Labs: Reviewed and significant for     Recent Labs    09/28/24 0219 09/29/24  0906  RBC 4.02* 2.97*  HEMOGLOBIN 12.8 9.7*  HEMATOCRIT 37.5 27.4*  PLATELETCT 336 262       Recent Labs    09/28/24 0219 09/29/24  0906  SODIUM 141 134*  POTASSIUM 3.8 3.9  CHLORIDE 106 101  CO2 22 24  GLUCOSE 106* 119*  BUN 10 13  CREATININE 0.68 0.74  CALCIUM 9.2 8.8       Recent Results  Recent Results (from the past 24 hour(s))  CBC WITHOUT DIFFERENTIAL    Collection Time: 09/29/24  9:06 AM  Result Value Ref Range    WBC 5.6 4.8 - 10.8 K/uL    RBC 2.97 (L) 4.20 - 5.40 M/uL    Hemoglobin 9.7 (L) 12.0 - 16.0 g/dL    Hematocrit 27.4 (L) 37.0 - 47.0 %    MCV 92.3 81.4 - 97.8  fL    MCH 32.7 27.0 - 33.0 pg    MCHC 35.4 32.2 - 35.5 g/dL    RDW 41.1 35.9 - 50.0 fL    Platelet Count 262 164 - 446 K/uL    MPV 9.5 9.0 - 12.9 fL  Basic Metabolic Panel    Collection Time: 09/29/24  9:06 AM  Result Value Ref Range    Sodium 134 (L) 135 - 145 mmol/L    Potassium 3.9 3.6 - 5.5 mmol/L    Chloride 101 96 - 112 mmol/L    Co2 24 20 - 33 mmol/L    Glucose 119 (H) 65 - 99 mg/dL    Bun 13 8 - 22 mg/dL    Creatinine 0.74 0.50 - 1.40 mg/dL    Calcium 8.8 8.5 - 10.5 mg/dL    Anion Gap 9.0 7.0 - 16.0  PHOSPHORUS    Collection Time: 09/29/24  9:06 AM  Result Value Ref Range    Phosphorus 3.0 2.5 - 4.5 mg/dL  MAGNESIUM    Collection Time: 09/29/24  9:06 AM  Result Value Ref Range    Magnesium 1.9 1.5 - 2.5 mg/dL  ESTIMATED GFR    Collection Time: 09/29/24  9:06 AM  Result Value Ref Range    GFR (CKD-EPI) 85 >60 mL/min/1.73 m 2             ASSESSMENT:  Patient is a 73 y.o. female admitted with left hip fracture      [facility] Code / Diagnosis to Support: 0008.11 - Orthopaedic Disorders: Status Post Unilateral Hip Fracture  See DISPO details below for recommendations on appropriate level of rehab for this diagnosis.     Barriers to transfer include: Insurance authorization, TCCs to verify disposition, medical clearance and bed availability      Assessment and Plan:  Left hip fracture   - sustained in GLF on 9/13   - [facility]   - transferred to [facility], ortho consulted   - 9/28 ORIF of the left femoral neck fracture with prosthetic replacement by Dr. cShroeder   - WBAT LLE , posterior hip precautions   - continue with PT/OT      ABLA   -post op drop in Hgb   - 9/29 Hgb 9.7   - primary team monitoring CBC      Hypotension  - was unable to tolerate gait due to hypotension   - not currently on antihypertensives   - GRACIELA hose ordered for when OOB      Hyponatremia   - post op drop in Na   - 9/29 Na 134  - primary team monitoring Cr         DISPO:  - patient is currently  "functioning below their level of baseline, recommend post acute rehab  - recommend IRF level therapy with 3hr of therapy 5 days per week   - prior to acceptance to IRF, will need insurance auth from [facility] (unless  medicare is primary?)   - [facility] to assist with insurance auth and confirmation of  DC support from friends          Medical Complexity:  Left hip fracture   ABLA   Hyponatremia   Impaired mobility and ADLs         DVT PPX: Lovenox         Thank you for allowing us to participate in the care of this patient.      Patient was seen for >80 minutes on unit/floor of which > 50% of time was spent on counseling and coordination of care regarding the above, including prognosis, risk reduction, benefits of treatment, and options for next stage of care.     Lydia Ashby D.O.   Physical Medicine and Rehabilitation      Please note that this dictation was created using voice recognition software. I have made every reasonable attempt to correct obvious errors, but there may be errors of grammar and possibly content that I did not discover before finalizing the note.       Co-morbidities:  See PMH  Potential Risk - Complications: Contractures, Deep Vein Thrombosis, Pain, Perceptual Impairment, Pneumonia, and Pressure Ulcer  Level of Risk: High    Ongoing Medical Management Needed (Medical/Nursing Needs):   Patient Active Problem List    Diagnosis Date Noted    Advance care planning 09/29/2024    Closed left hip fracture, initial encounter (HCC) 09/27/2024    Preop cardiovascular exam 09/27/2024    Hyponatremia 09/27/2024    Left displaced femoral neck fracture (HCC) 09/27/2024    Fracture of phalanx of left index finger 07/17/2015    Finger pain, left 07/17/2015    Stiffness of finger joint 07/17/2015     A/o  Current Vital Signs:   Temperature: 36.9 °C (98.4 °F) Pulse: 90 Respiration: 17 Blood Pressure : 117/64  Weight: 68.1 kg (150 lb 2.1 oz) Height: 172.7 cm (5' 8\")  Pulse Oximetry: 98 % O2 (LPM): 0      Completed " Laboratory Reports:  Recent Labs     09/29/24  0906 09/30/24  0621 10/01/24  0409   WBC 5.6 5.7 6.3   HEMOGLOBIN 9.7* 8.8* 8.7*   HEMATOCRIT 27.4* 25.6* 24.8*   PLATELETCT 262 280 286   SODIUM 134* 132* 134*   POTASSIUM 3.9 3.9 4.6   BUN 13 10 13   CREATININE 0.74 0.68 0.58   GLUCOSE 119* 123* 121*     Additional Labs: Not Applicable    Prior Living Situation:   Housing / Facility: 1 Story House  Steps Into Home: 2  Steps In Home: 0  Lives with - Patient's Self Care Capacity: Alone and Able to Care For Self  Equipment Owned: Single Point Cane    Prior Level of Function / Living Situation:   Physical Therapy: Prior Services: Home-Independent  Housing / Facility: 1 Story House  Steps Into Home: 2  Steps In Home: 0  Bathroom Set up: Walk In Shower  Equipment Owned: Single Point Cane  Lives with - Patient's Self Care Capacity: Alone and Able to Care For Self  Bed Mobility: Independent  Transfer Status: Independent  Ambulation: Independent  Assistive Devices Used: None  Stairs: Independent  Current Level of Function:   Gait Level Of Assist: Contact Guard Assist  Assistive Device: Front Wheel Walker  Distance (Feet): 50  Deviation: Antalgic, Decreased Toe Off, Decreased Heel Strike  Weight Bearing Status: WBAT LLE  Skilled Intervention: Verbal Cuing, Sequencing, Compensatory Strategies  Supine to Sit: Standby Assist  Sit to Supine: Moderate Assist (assist for LE positioning)  Scooting: Supervised  Rolling: Supervised  Skilled Intervention: Verbal Cuing, Sequencing  Comments: HOB flat, use of bed rails  Sit to Stand: Contact Guard Assist  Bed, Chair, Wheelchair Transfer: Contact Guard Assist  Toilet Transfers: Contact Guard Assist  Transfer Method: Stand Step  Skilled Intervention: Verbal Cuing, Tactile Cuing, Sequencing  Sitting in Chair: 30min  Sitting Edge of Bed: 2min  Standing: <1min  Occupational Therapy:   Self Feeding: Independent  Grooming / Hygiene: Independent  Bathing: Independent  Dressing:  Independent  Toileting: Independent  Medication Management: Independent  Laundry: Independent  Kitchen Mobility: Independent  Finances: Independent  Home Management: Independent  Shopping: Independent  Prior Level Of Mobility: Independent Without Device in Community  Driving / Transportation: Driving Independent  Prior Services: Home-Independent  Housing / Facility: [address]  Current Level of Function:   Eating: Independent  Upper Body Dressing: Supervision  Lower Body Dressing: Moderate Assist (reacher to don shorts)  Toileting: Standby Assist  Speech Language Pathology:      Rehabilitation Prognosis/Potential: Good  Estimated Length of Stay: 10-14 days    Nursing:      Continent    Scope/Intensity of Services Recommended:  Physical Therapy: 1.5 hr / day  5 days / week. Therapeutic Interventions Required: Maximize Endurance, Mobility, Strength, and Safety  Occupational Therapy: 1.5 hr / day 5 days / week. Therapeutic Interventions Required: Maximize Self Care, ADLs, IADLs, and Energy Conservation  Rehabilitation Nursin/7. Therapeutic Interventions Required: Monitor Pain, Skin, Vital Signs, Intake and Output, Labs, Safety, and Family Training  Rehabilitation Physician: 3 - 5 days / week. Therapeutic Interventions Required: Medical Management    She requires 24-hour rehabilitation nursing to manage bowel and bladder function, skin care, surgical incision, nutrition and fluid intake, pulmonary hygiene, pain control, safety, medication management, and patient/family goals. In addition, rehabilitation nursing will reiterate and reinforce therapy skills and equipment use, including ADLs, as well as provide education to the patient and family. Ashley Gillespie is willing to participate in and is able to tolerate the proposed plan of care.    Rehabilitation Goals and Plan (Expected frequency & duration of treatment in the IRF):   Return to the Community, Modified Independent Level of Care, Minimal  Assist Level of Care, and Family Able to Provide 24/7 Assistance  Anticipated Date of Rehabilitation Admission: 10/1/24  Patient/Family oriented IRF level of care/facility/plan: Yes  Patient/Family willing to participate in IRF care/facility/plan: Yes  Patient able to tolerate IRF level of care proposed: Yes  Patient has potential to benefit IRF level of care proposed: Yes  Comments: Not Applicable    Special Needs or Precautions - Medical Necessity:  Safety Concerns/Precautions:  Fall Risk / High Risk for Falls and Balance  Pain Management  Current Medications:    Current Facility-Administered Medications Ordered in Epic   Medication Dose Route Frequency Provider Last Rate Last Admin    enoxaparin (Lovenox) inj 40 mg  40 mg Subcutaneous QDAY David cShroeder M.D.   40 mg at 09/29/24 1926    Pharmacy Consult Request ...Pain Management Review 1 Each  1 Each Other PHARMACY TO DOSE David Schroeder M.D.        ondansetron (Zofran) syringe/vial injection 4 mg  4 mg Intravenous Q4HRS PRN David Schroeder M.D.        dexamethasone (Decadron) injection 4 mg  4 mg Intravenous Once PRN David Schroeder M.D.        diphenhydrAMINE (Benadryl) injection 25 mg  25 mg Intravenous Q6HRS PRN David Schroeder M.D.        haloperidol lactate (Haldol) injection 1 mg  1 mg Intravenous Q6HRS PRN David Schroeder M.D.        scopolamine (Transderm-Scop) patch 1 Patch  1 Patch Transdermal Q72HRS PRN David Schroeder M.D.        docusate sodium (Colace) capsule 100 mg  100 mg Oral BID David Schroeder M.D.   100 mg at 10/01/24 0447    senna-docusate (Pericolace Or Senokot S) 8.6-50 MG per tablet 1 Tablet  1 Tablet Oral Nightly David Schroeder M.D.   1 Tablet at 09/30/24 2004    senna-docusate (Pericolace Or Senokot S) 8.6-50 MG per tablet 1 Tablet  1 Tablet Oral Q24HRS PRN David Schroeder M.D.        polyethylene glycol/lytes (Miralax) Packet 1 Packet  1 Packet Oral BID PRN David Schroeder M.D.   1 Packet at  09/30/24 0519    magnesium hydroxide (Milk Of Magnesia) suspension 30 mL  30 mL Oral QDAY PRN David Schroeder M.D.        bisacodyl (Dulcolax) suppository 10 mg  10 mg Rectal Q24HRS PRN David Schroeder M.D.   10 mg at 10/01/24 0447    sodium phosphate enema 1 Each  1 Each Rectal Once PRN David Schroeder M.D.        acetaminophen (Tylenol) tablet 650 mg  650 mg Oral Q6HRS David Schroeder M.D.   650 mg at 10/01/24 1215    Followed by    [START ON 10/3/2024] acetaminophen (Tylenol) tablet 650 mg  650 mg Oral Q6HRS PRN David Schroeder M.D.        oxyCODONE immediate-release (Roxicodone) tablet 5 mg  5 mg Oral Q3HRS PRN David Schroeder M.D.   5 mg at 09/29/24 1332    Or    oxyCODONE immediate release (Roxicodone) tablet 10 mg  10 mg Oral Q3HRS PRN David Schroeder M.D.   10 mg at 10/01/24 1020    Or    HYDROmorphone (Dilaudid) injection 0.5 mg  0.5 mg Intravenous Q3HRS PRN David Schroeder M.D.   0.5 mg at 09/30/24 1334    ondansetron (Zofran ODT) dispertab 4 mg  4 mg Oral Q4HRS PRN Crissy Stafford M.D.         No current [facility]-ordered outpatient medications on file.     Diet:   DIET ORDERS (From admission to next 24h)       Start     Ordered    09/28/24 1000  Diet Order Diet: Regular  ALL MEALS        Question:  Diet:  Answer:  Regular    09/28/24 0959                    Anticipated Discharge Destination / Patient/Family Goal:  Destination: Home with Assistance Support System: Family  and Friends  Anticipated home health services: OT, PT, and Nursing  Previously used HH service/ provider: Not Applicable  Anticipated DME Needs: Walker  Outpatient Services: OT and PT  Alternative resources to address additional identified needs:   No future appointments.   Pre-Screen Completed: 10/1/2024 12:41 PM Kristina Jimenez

## 2024-10-01 NOTE — PROGRESS NOTES
Hospital Medicine Daily Progress Note    Date of Service  9/30/2024    Chief Complaint  Ashley Gillespie is a 73 y.o. female admitted 9/27/2024 with fall    Hospital Course  73 y.o. female [facility] 9/27/2024. Patient fell on 9/13. She lost balance when opening the door for her Dog. [facility], and no fracture was found, however pain is getting progressively worse. She purchased a cane to help with ambulation but [facility] CT pelvis was done showing Left Subcapital fracture. She is transferred to [facility] because [facility] are no [facility] to perform procedure.   Orthopedics was consulted, patient underwent Open treatment of left femoral fracture, proximal end, neck with prosthetic replacement 9/28.   Recommends bear weight as tolerated on their operative extremity with posterior hip precautions.     Interval Problem Update  Seen patient at bedside  No acute overnight events  Left hip pain, worsening with movement  Continue multimodal pain managements  Pending insurance authorization for IPR  Labs reviewed, Na 132    I have discussed this patient's plan of care and discharge plan at IDT rounds today with Case Management, Nursing, Nursing leadership, and other members of the IDT team.    Consultants/Specialty  orthopedics    Code Status  Full Code    Disposition  The patient is not medically cleared for discharge to home or a post-acute facility.      I have placed the appropriate orders for post-discharge needs.    Review of Systems  Review of Systems   Musculoskeletal:  Positive for falls and joint pain.   All other systems reviewed and are negative.       Physical Exam  Temp:  [36.5 °C (97.7 °F)-37.1 °C (98.8 °F)] 36.5 °C (97.7 °F)  Pulse:  [66-89] 89  Resp:  [17-18] 17  BP: (114-152)/(56-62) 122/56  SpO2:  [96 %-99 %] 97  %    Physical Exam  Vitals and nursing note reviewed.   Constitutional:       Appearance: Normal appearance.   HENT:      Head: Normocephalic and atraumatic.      Nose: Nose normal.      Mouth/Throat:      Pharynx: Oropharynx is clear.   Eyes:      Extraocular Movements: Extraocular movements intact.      Conjunctiva/sclera: Conjunctivae normal.      Pupils: Pupils are equal, round, and reactive to light.   Cardiovascular:      Rate and Rhythm: Normal rate and regular rhythm.      Pulses: Normal pulses.      Heart sounds: Normal heart sounds.   Pulmonary:      Effort: Pulmonary effort is normal.      Breath sounds: Normal breath sounds.   Abdominal:      General: Abdomen is flat. Bowel sounds are normal.      Palpations: Abdomen is soft.   Musculoskeletal:         General: Tenderness present.      Cervical back: Normal range of motion and neck supple.      Comments: Limited range of motion of the left hip due to pain   Skin:     General: Skin is warm and dry.   Neurological:      General: No focal deficit present.      Mental Status: She is alert and oriented to person, place, and time. Mental status is at baseline.   Psychiatric:         Mood and Affect: Mood normal.         Behavior: Behavior normal.         Fluids  No intake or output data in the 24 hours ending 09/30/24 1725     Laboratory  Recent Labs     09/28/24  0219 09/29/24  0906 09/30/24  0621   WBC 6.1 5.6 5.7   RBC 4.02* 2.97* 2.71*   HEMOGLOBIN 12.8 9.7* 8.8*   HEMATOCRIT 37.5 27.4* 25.6*   MCV 93.3 92.3 94.5   MCH 31.8 32.7 32.5   MCHC 34.1 35.4 34.4   RDW 42.1 41.1 42.5   PLATELETCT 336 262 280   MPV 9.7 9.5 10.1     Recent Labs     09/28/24  0219 09/29/24  0906 09/30/24  0621   SODIUM 141 134* 132*   POTASSIUM 3.8 3.9 3.9   CHLORIDE 106 101 97   CO2 22 24 25   GLUCOSE 106* 119* 123*   BUN 10 13 10   CREATININE 0.68 0.74 0.68   CALCIUM 9.2 8.8 8.9                   Imaging  No orders to display        Assessment/Plan  * Closed left hip fracture,  initial encounter (HCC)- (present on admission)  Assessment & Plan  -Inpatient status: Medical Floor  -NPO at 12 am: OK to have a light meal before 12 am.   [facility]: Acute [facility]capital femoral neck fracture. Images were uploaded.  -I appreciate Orthopedic Consult and recommendations. I personally discussed this case with Dr. Schroeder. Plan for OR in am  -Pain control with IV narcotics    Advance care planning  Assessment & Plan  Patient is 73-year-old female, admitted with displaced left femoral neck fracture, requiring surgical interventions, PT/OT/PMR.  I discussed goal of care and CODE STATUS with patient at bedside.  Patient has a medical decision capacity.  The patient confirms DNR/DNI. ACP 16 mins    Left displaced femoral neck fracture (HCC)- (present on admission)  Assessment & Plan  CT pelvis outside hospital showing Left Subcapital fracture.   Orthopedics was consulted, patient underwent Open treatment of left femoral fracture, proximal end, neck with prosthetic replacement 9/28.   Recommends bear weight as tolerated on their operative extremity with posterior hip precautions.   PT/OT/PMR  Multimodal pain managements including po and iv narcotics prn. Monitoring respiratory status and sedation score      Hyponatremia  Assessment & Plan  Mild  Continue monitoring   Free water restriction      Preop cardiovascular exam  Assessment & Plan  Cardiovascular:   Patient does not have history of CHF  Pre-op EKG: Yes    Pulmonary:  Oxygen per protocol    GI:   No history of cirrhosis. Standard bowel regimen. Hold for loose stools.    Renal:   IV fluids: -150 mL/hr for 2 days   Labs: Metabolic Panel with AM labs    Musculoskeletal:   Check 25 OH vitamin D level. If 31-40 pg/mL, consider starting vitamin D3 1000 IU PO daily. If 20-30 pg/mL, consider vitamin D3 2000 IU PO daily. If <20 pg/mL, vitamin D2 50,000 IU weekly x 8 weeks then 2000 IU PO daily.    Neurologic:   Pain Control: Neuro checks every  4 hours  Avoid fentanyl (short-acting)  Acetaminophen 1000 mg PO TID; 650 mg PO TID if liver problems    Hematologic:  Plan on pharmacologic DVT prophylaxis post operative day #1. Hold for decreasing hemoglobin. Notify provider for hemoglobin less than 8.  Order preoperative type and cross.   If patient was on anticoagulation prior to arrival risks and benefits will be weighed by teams including surgery, hospitalist, geriatrics, and anesthesia for delaying surgery more than 24 hours.   On anticoagulation prior to arrival: No    Medical Assessment Risk:  Intermediate    Surgical Risk:   Intermediate    No prior PMH, she is medically optimized for Surgical procedure without additional workup           VTE prophylaxis: lovenox    I have performed a physical exam and reviewed and updated ROS and Plan today (9/30/2024). In review of yesterday's note (9/29/2024), there are no changes except as documented above.    Patient is has a high medical complexity, complex decision making and is at high risk for complication, morbidity, and mortality.  I spent 51 minutes, reviewing the chart, obtaining and/or reviewing separately obtained history. Performing a medically appropriate examination and evaluation.  Counseling and educating the patient. Ordering and reviewing medications, tests, or procedures.  Discussing the case with ortho.  Documenting clinical information in EPIC. Independently interpreting results and communicating results to patient. Discussing future disposition of care with patient, RN and case management.  This does not include time spent on separately billable procedures/tests.

## 2024-10-01 NOTE — CARE PLAN
The patient is Stable - Low risk of patient condition declining or worsening    Shift Goals  Clinical Goals: Pain control, mboility, safety  Patient Goals: Comfort, rest, mobility, discharge plan  Family Goals: N/A    Progress made toward(s) clinical / shift goals:  Yes      Problem: Pain - Standard  Goal: Alleviation of pain or a reduction in pain to the patient’s comfort goal  Outcome: Progressing    Patient experiencing pain related to left hip surgery. Educated patient on the pain scale and reporting pain promptly. Patient stated understanding and demonstrated understanding throughout the shift. Non-pharm interventions used in addition such as rest, repositioned, ice, extra pillows, and blankets.       Problem: Knowledge Deficit  Goal: Knowledge of disease process/condition, treatment plan, diagnostic tests, and medications will improve  Outcome: Progressing    Educated patient on the plan of care for the day, questions answered, patient states understanding.    Problem: Respiratory  Goal: Patient will achieve/maintain optimum respiratory ventilation and gas exchange  Outcome: Progressing     Problem: Infection - Standard  Goal: Patient will remain free from infection  Outcome: Progressing     Problem: Wound/ / Incision Healing  Goal: Patient's wound/surgical incision will decrease in size and heals properly  Outcome: Progressing       Patient is not progressing towards the following goals:

## 2024-10-02 ENCOUNTER — HOSPITAL ENCOUNTER (INPATIENT)
Facility: REHABILITATION | Age: 74
LOS: 6 days | DRG: 560 | End: 2024-10-08
Attending: PHYSICAL MEDICINE & REHABILITATION | Admitting: PHYSICAL MEDICINE & REHABILITATION
Payer: COMMERCIAL

## 2024-10-02 VITALS
TEMPERATURE: 96.9 F | SYSTOLIC BLOOD PRESSURE: 118 MMHG | BODY MASS INDEX: 22.75 KG/M2 | DIASTOLIC BLOOD PRESSURE: 66 MMHG | HEIGHT: 68 IN | HEART RATE: 72 BPM | OXYGEN SATURATION: 96 % | WEIGHT: 150.13 LBS | RESPIRATION RATE: 16 BRPM

## 2024-10-02 DIAGNOSIS — S72.002S HIP FRACTURE, LEFT, SEQUELA: ICD-10-CM

## 2024-10-02 PROCEDURE — 700102 HCHG RX REV CODE 250 W/ 637 OVERRIDE(OP): Performed by: ORTHOPAEDIC SURGERY

## 2024-10-02 PROCEDURE — 99239 HOSP IP/OBS DSCHRG MGMT >30: CPT | Performed by: STUDENT IN AN ORGANIZED HEALTH CARE EDUCATION/TRAINING PROGRAM

## 2024-10-02 PROCEDURE — A9270 NON-COVERED ITEM OR SERVICE: HCPCS | Performed by: ORTHOPAEDIC SURGERY

## 2024-10-02 PROCEDURE — A9270 NON-COVERED ITEM OR SERVICE: HCPCS | Performed by: PHYSICAL MEDICINE & REHABILITATION

## 2024-10-02 PROCEDURE — 700111 HCHG RX REV CODE 636 W/ 250 OVERRIDE (IP): Mod: JZ | Performed by: PHYSICAL MEDICINE & REHABILITATION

## 2024-10-02 PROCEDURE — 770010 HCHG ROOM/CARE - REHAB SEMI PRIVAT*

## 2024-10-02 PROCEDURE — 99223 1ST HOSP IP/OBS HIGH 75: CPT | Performed by: PHYSICAL MEDICINE & REHABILITATION

## 2024-10-02 PROCEDURE — 700102 HCHG RX REV CODE 250 W/ 637 OVERRIDE(OP): Performed by: PHYSICAL MEDICINE & REHABILITATION

## 2024-10-02 PROCEDURE — 94760 N-INVAS EAR/PLS OXIMETRY 1: CPT

## 2024-10-02 RX ORDER — ENOXAPARIN SODIUM 100 MG/ML
40 INJECTION SUBCUTANEOUS
Status: CANCELLED | OUTPATIENT
Start: 2024-10-02

## 2024-10-02 RX ORDER — ONDANSETRON 2 MG/ML
4 INJECTION INTRAMUSCULAR; INTRAVENOUS 4 TIMES DAILY PRN
Status: DISCONTINUED | OUTPATIENT
Start: 2024-10-02 | End: 2024-10-08 | Stop reason: HOSPADM

## 2024-10-02 RX ORDER — HYDROMORPHONE HYDROCHLORIDE 2 MG/ML
0.5 INJECTION, SOLUTION INTRAMUSCULAR; INTRAVENOUS; SUBCUTANEOUS
Status: DISCONTINUED | OUTPATIENT
Start: 2024-10-02 | End: 2024-10-02

## 2024-10-02 RX ORDER — POLYETHYLENE GLYCOL 3350 17 G/17G
1 POWDER, FOR SOLUTION ORAL
Status: DISCONTINUED | OUTPATIENT
Start: 2024-10-02 | End: 2024-10-08 | Stop reason: HOSPADM

## 2024-10-02 RX ORDER — ACETAMINOPHEN 325 MG/1
650 TABLET ORAL EVERY 6 HOURS
Status: DISPENSED | OUTPATIENT
Start: 2024-10-02 | End: 2024-10-03

## 2024-10-02 RX ORDER — ONDANSETRON 4 MG/1
4 TABLET, ORALLY DISINTEGRATING ORAL 4 TIMES DAILY PRN
Status: DISCONTINUED | OUTPATIENT
Start: 2024-10-02 | End: 2024-10-08 | Stop reason: HOSPADM

## 2024-10-02 RX ORDER — OXYCODONE HYDROCHLORIDE 10 MG/1
10 TABLET ORAL
Status: DISCONTINUED | OUTPATIENT
Start: 2024-10-02 | End: 2024-10-08 | Stop reason: HOSPADM

## 2024-10-02 RX ORDER — AMOXICILLIN 250 MG
2 CAPSULE ORAL 2 TIMES DAILY
Status: DISCONTINUED | OUTPATIENT
Start: 2024-10-02 | End: 2024-10-08 | Stop reason: HOSPADM

## 2024-10-02 RX ORDER — ECHINACEA PURPUREA EXTRACT 125 MG
2 TABLET ORAL PRN
Status: DISCONTINUED | OUTPATIENT
Start: 2024-10-02 | End: 2024-10-08 | Stop reason: HOSPADM

## 2024-10-02 RX ORDER — ACETAMINOPHEN 325 MG/1
650 TABLET ORAL EVERY 6 HOURS PRN
Status: CANCELLED | OUTPATIENT
Start: 2024-10-03

## 2024-10-02 RX ORDER — BISACODYL 10 MG
10 SUPPOSITORY, RECTAL RECTAL
Status: DISCONTINUED | OUTPATIENT
Start: 2024-10-02 | End: 2024-10-08 | Stop reason: HOSPADM

## 2024-10-02 RX ORDER — OXYCODONE HYDROCHLORIDE 10 MG/1
10 TABLET ORAL
Status: CANCELLED | OUTPATIENT
Start: 2024-10-02

## 2024-10-02 RX ORDER — HYDROMORPHONE HYDROCHLORIDE 1 MG/ML
0.5 INJECTION, SOLUTION INTRAMUSCULAR; INTRAVENOUS; SUBCUTANEOUS
Status: CANCELLED | OUTPATIENT
Start: 2024-10-02

## 2024-10-02 RX ORDER — ACETAMINOPHEN 325 MG/1
650 TABLET ORAL EVERY 6 HOURS
Status: CANCELLED | OUTPATIENT
Start: 2024-10-02 | End: 2024-10-03

## 2024-10-02 RX ORDER — HYDRALAZINE HYDROCHLORIDE 25 MG/1
25 TABLET, FILM COATED ORAL EVERY 8 HOURS PRN
Status: DISCONTINUED | OUTPATIENT
Start: 2024-10-02 | End: 2024-10-08 | Stop reason: HOSPADM

## 2024-10-02 RX ORDER — OXYCODONE HYDROCHLORIDE 5 MG/1
5 TABLET ORAL
Status: DISCONTINUED | OUTPATIENT
Start: 2024-10-02 | End: 2024-10-08 | Stop reason: HOSPADM

## 2024-10-02 RX ORDER — OXYCODONE HYDROCHLORIDE 5 MG/1
5 TABLET ORAL
Status: CANCELLED | OUTPATIENT
Start: 2024-10-02

## 2024-10-02 RX ORDER — BISACODYL 10 MG
10 SUPPOSITORY, RECTAL RECTAL
Status: CANCELLED | OUTPATIENT
Start: 2024-10-02

## 2024-10-02 RX ORDER — ENOXAPARIN SODIUM 100 MG/ML
40 INJECTION SUBCUTANEOUS
Status: DISCONTINUED | OUTPATIENT
Start: 2024-10-02 | End: 2024-10-08 | Stop reason: HOSPADM

## 2024-10-02 RX ORDER — TRAZODONE HYDROCHLORIDE 50 MG/1
50 TABLET, FILM COATED ORAL
Status: DISCONTINUED | OUTPATIENT
Start: 2024-10-02 | End: 2024-10-08 | Stop reason: HOSPADM

## 2024-10-02 RX ORDER — METHOCARBAMOL 750 MG/1
750 TABLET, FILM COATED ORAL 4 TIMES DAILY PRN
Status: DISCONTINUED | OUTPATIENT
Start: 2024-10-02 | End: 2024-10-08 | Stop reason: HOSPADM

## 2024-10-02 RX ORDER — ACETAMINOPHEN 325 MG/1
650 TABLET ORAL EVERY 6 HOURS PRN
Status: DISCONTINUED | OUTPATIENT
Start: 2024-10-03 | End: 2024-10-08 | Stop reason: HOSPADM

## 2024-10-02 RX ORDER — OXYCODONE HYDROCHLORIDE 5 MG/1
5 TABLET ORAL ONCE
Status: COMPLETED | OUTPATIENT
Start: 2024-10-03 | End: 2024-10-03

## 2024-10-02 RX ADMIN — ACETAMINOPHEN 650 MG: 325 TABLET ORAL at 12:45

## 2024-10-02 RX ADMIN — ACETAMINOPHEN 650 MG: 325 TABLET ORAL at 04:23

## 2024-10-02 RX ADMIN — OXYCODONE HYDROCHLORIDE 10 MG: 10 TABLET ORAL at 20:23

## 2024-10-02 RX ADMIN — ACETAMINOPHEN 650 MG: 325 TABLET ORAL at 17:21

## 2024-10-02 RX ADMIN — SENNOSIDES AND DOCUSATE SODIUM 2 TABLET: 50; 8.6 TABLET ORAL at 20:22

## 2024-10-02 RX ADMIN — DOCUSATE SODIUM 100 MG: 100 CAPSULE, LIQUID FILLED ORAL at 04:23

## 2024-10-02 RX ADMIN — OXYCODONE HYDROCHLORIDE 10 MG: 10 TABLET ORAL at 09:06

## 2024-10-02 RX ADMIN — OXYCODONE HYDROCHLORIDE 10 MG: 10 TABLET ORAL at 13:50

## 2024-10-02 RX ADMIN — ENOXAPARIN SODIUM 40 MG: 100 INJECTION SUBCUTANEOUS at 20:23

## 2024-10-02 ASSESSMENT — PATIENT HEALTH QUESTIONNAIRE - PHQ9
SUM OF ALL RESPONSES TO PHQ9 QUESTIONS 1 AND 2: 0
2. FEELING DOWN, DEPRESSED, IRRITABLE, OR HOPELESS: NOT AT ALL
1. LITTLE INTEREST OR PLEASURE IN DOING THINGS: NOT AT ALL
2. FEELING DOWN, DEPRESSED, IRRITABLE, OR HOPELESS: NOT AT ALL
SUM OF ALL RESPONSES TO PHQ9 QUESTIONS 1 AND 2: 0
1. LITTLE INTEREST OR PLEASURE IN DOING THINGS: NOT AT ALL

## 2024-10-02 ASSESSMENT — LIFESTYLE VARIABLES: EVER_SMOKED: NEVER

## 2024-10-02 ASSESSMENT — PAIN DESCRIPTION - PAIN TYPE
TYPE: ACUTE PAIN

## 2024-10-02 NOTE — DISCHARGE SUMMARY
Discharge Summary    CHIEF COMPLAINT ON ADMISSION  No chief complaint on file.      Reason for Admission  L Hip Fracture     Admission Date  9/27/2024    CODE STATUS  Full Code    HPI & HOSPITAL COURSE  3 y.o. female who [facility] Fracture on 9/27/2024. Patient fell on 9/13. She lost balance when opening the door for her Dog. [facility], and no fracture was found, however pain is getting progressively worse. She purchased a cane to help with ambulation but [facility] CT pelvis was done showing Left Subcapital fracture. She is transferred to [facility] because there are no [facility] until [facility] to perform procedure.   Orthopedics was consulted, patient underwent Open treatment of left femoral fracture, proximal end, neck with prosthetic replacement 9/28.   Recommends bear weight as tolerated on their operative extremity with posterior hip precautions.  [facility]    At the time of discharge patient remain hemodynamically stable ,asymptomatic ,labs remain unremarkable .  Patient will be discharged with close follow up with PCP, orthopedics.Discharge plan was discussed with patient in details .  Patient agreed with discharge plan  and  all questions answered.          Therefore, she is discharged in good and stable condition to home with close outpatient follow-up.    The patient met 2-midnight criteria for an inpatient stay at the time of discharge.    Discharge Date  10/2/2024          DISCHARGE DIAGNOSES  Principal Problem:    Closed left hip fracture, initial encounter (HCC) (POA: Yes)  Active Problems:    Hyponatremia (POA: Unknown)    Left displaced femoral neck fracture (HCC) (POA: Yes)    Advance care planning (POA: Unknown)  Resolved Problems:    Preop cardiovascular exam (POA: Unknown)      FOLLOW UP  No future appointments.  No follow-up provider  specified.    MEDICATIONS ON DISCHARGE     Medication List      You have not been prescribed any medications.         Allergies  Allergies   Allergen Reactions    Morphine Rash     Per pt all over chest    Codeine     Penicillins        DIET  Orders Placed This Encounter   Procedures    Diet Order Diet: Regular     Standing Status:   Standing     Number of Occurrences:   1     Order Specific Question:   Diet:     Answer:   Regular [1]       ACTIVITY  As tolerated.  Weight bearing as tolerated    CONSULTATIONS  ortho    PROCEDURES  No orders to display         LABORATORY  Lab Results   Component Value Date    SODIUM 134 (L) 10/01/2024    POTASSIUM 4.6 10/01/2024    CHLORIDE 100 10/01/2024    CO2 25 10/01/2024    GLUCOSE 121 (H) 10/01/2024    BUN 13 10/01/2024    CREATININE 0.58 10/01/2024        Lab Results   Component Value Date    WBC 6.3 10/01/2024    HEMOGLOBIN 8.7 (L) 10/01/2024    HEMATOCRIT 24.8 (L) 10/01/2024    PLATELETCT 286 10/01/2024        Total time of the discharge process exceeds 36 minutes.

## 2024-10-02 NOTE — DISCHARGE PLANNING
[facility]. Patient has been accepted by Dr Sanchez at [facility]. Transport set for 1200 GMT wheelchair, nurse report [id number]. Notified care team.    Spoke with patient who is agreeable to coming to [facility]. Discussed expected LOS, TCC will continue to follow.

## 2024-10-02 NOTE — DISCHARGE PLANNING
Choice obtained, medically cleared and authorized for [facility].   COBRA signed by patient and physician.   Transportation arranged for [facility] WC pickup at 1200 today, 10/2/2024 to the care of Dr. Sanchez.   JASEN and face sheets given to bedside RN along with number for RN report to Rehab.   Patient agreeable to discharge and has notified her family.   No further needs from case management at this time.     Case Management Discharge Planning    Admission Date: 9/27/2024  GMLOS: 4.1  ALOS: 5    6-Clicks ADL Score: 19  6-Clicks Mobility Score: 17  PT and/or OT Eval ordered: Yes  Post-acute Referrals Ordered: Yes  Post-acute Choice Obtained: Yes  Has referral(s) been sent to post-acute provider:  Yes      Anticipated Discharge Dispo: Discharge Disposition: Disch to  rehab facility or distinct part unit (62)  Discharge Address: [address] [place] [address]  Discharge Contact Phone Number: [phone]    DME Needed: No    Action(s) Taken: Updated Provider/Nurse on Discharge Plan, Patient Conference, Choice obtained, Acceptance Received, and Transport Arranged     Escalations Completed: None    Medically Clear: Yes    Next Steps: No further CM needs at this time.     Barriers to Discharge: None    Is the patient up for discharge tomorrow: Today, 10/2/2024, via [facility] at 1200.

## 2024-10-02 NOTE — CARE PLAN
Chief Complaint   Patient presents with    Urinary Tract Infection      2 weeks     Vaginal Discharge      yellow 2 weeks       HPI:  Clover Roman is a 28 y.o. female patient  who presents today for evaluation of urinary symptoms.  For the past two weeks, she describes having a strong odor in her urine as well as ?mild dysuria.  Denies urgency and frequency.  No fever.  Also, for the past week, she has noted an increased vaginal discharge but no itching or odor.  No pelvic pain.  She is currently on LoLoEstrin with essentially no bleeding.  No LMP recorded. (Menstrual status: Birth Control).     Pap 10/2/18: Negative    Urine dip today: 2+leukocytes, Nitrite+, large blood      Past Medical History:   Diagnosis Date    Allergy     Seasonal    Asthma     Dysmenorrhea     GERD (gastroesophageal reflux disease)     Keloid cicatrix        Past Surgical History:   Procedure Laterality Date    24 HOUR PH WITH IMPEDANCE N/A 2016    Performed by Julian [facility]ard [facility] at [facility] (4TH FLR)    ESOPHAGEAL GAMBINO PH N/A 3/22/2017    Performed by Calixto Martin MD at [facility] (4TH FLR)    ESOPHAGEAL BRAVO PH N/A 2016    Performed by Calixto Martin MD at [facility] (4TH FLR)    ESOPHAGOGASTRODUODENOSCOPY (EGD) N/A 3/22/2017    Performed by Calixto Martin MD at [facility] (4TH FLR)    ESOPHAGOGASTRODUODENOSCOPY (EGD) N/A 2016    Performed by Calixto Martin MD at [facility] (4TH FLR)    ESOPHAGOGASTRODUODENOSCOPY (EGD) N/A 2016    Performed by Calixto Martin MD at [facility] (4TH FLR)    FOOT SURGERY  bilateral foot surgeries    LAPAROSCOPY, DIAGNOSTIC N/A 2013    Performed by Td [facility]afuerte [facility] at [facility] OR    MANOMETRY-ESOPHAGEAL-HIGH RESOLUTION N/A 2016    Performed by Julian Heard MD at [facility] (4TH FLR)    NOSE SURGERY  Age 11    To cauterize bleeing vessels    PELVIC LAPAROSCOPY      TYMPANOSTOMY TUBE PLACEMENT      WISDOM TOOTH EXTRACTION    The patient is Stable - Low risk of patient condition declining or worsening    Shift Goals  Clinical Goals: Discharge, pain management, comfort  Patient Goals: Discharge, pain management  Family Goals: N/A    Progress made toward(s) clinical / shift goals:  Yes      Problem: Pain - Standard  Goal: Alleviation of pain or a reduction in pain to the patient’s comfort goal  Outcome: Met     Problem: Respiratory  Goal: Patient will achieve/maintain optimum respiratory ventilation and gas exchange  Outcome: Met     Problem: Wound/ / Incision Healing  Goal: Patient's wound/surgical incision will decrease in size and heals properly  Outcome: Met     Problem: Knowledge Deficit  Goal: Knowledge of the prescribed therapeutic regimen will improve  Outcome: Met       Patient is not progressing towards the following goals:               ROS:  GENERAL: Feeling well overall.   SKIN: Denies rash or lesions.   HEAD: Denies head injury or headache.   NODES: Denies enlarged lymph nodes.   CHEST: Denies chest pain or shortness of breath.   CARDIOVASCULAR: Denies palpitations or left sided chest pain.   ABDOMEN: No abdominal pain, nausea, vomiting or rectal bleeding.   URINARY: Reports urine odor.  REPRODUCTIVE: See HPI.   BREASTS: Denies pain, lumps, or nipple discharge.   HEMATOLOGIC: No easy bruisability or excessive bleeding.   MUSCULOSKELETAL: Denies joint pain or swelling.   NEUROLOGIC: Denies syncope or weakness.   PSYCHIATRIC: Denies depression.    PE:   (chaperone present during entire exam)  APPEARANCE: Well nourished, well developed, in no acute distress.  ABDOMEN: Soft. No tenderness or masses. No CVA tenderness.  VULVA: No lesions. Normal female genitalia.  URETHRAL MEATUS: Normal size and location, no lesions, no prolapse.  VAGINA: Moist and well rugated, mild amount of thin white discharge.  CERVIX: No lesions and discharge.       Diagnosis:  1. Acute cystitis without hematuria    2. Acute vaginitis          PLAN:    Orders Placed This Encounter    Urine culture    Vaginosis Screen by DNA Probe    nitrofurantoin, macrocrystal-monohydrate, (MACROBID) 100 MG capsule       Patient was counseled today on her bladder symptoms.  UA dip is consistent with a UTI and she will begin Macrobid.  Urine will be sent for a culture.  We also discussed vaginitis for which an Affirm was performed.    Follow-up for annual exam    Total time of visit: 20 minutes (counseling >75% of time)

## 2024-10-02 NOTE — CARE PLAN
Problem: Pain - Standard  Goal: Alleviation of pain or a reduction in pain to the patient’s comfort goal  Description: Target End Date:  Prior to discharge or change in level of care    Document on Vitals flowsheet    1.  Document pain using the appropriate pain scale per order or unit policy  2.  Educate and implement non-pharmacologic comfort measures (i.e. relaxation, distraction, massage, cold/heat therapy, etc.)  3.  Pain management medications as ordered  4.  Reassess pain after pain med administration per policy  5.  If opiods administered assess patient's response to pain medication is appropriate per POSS sedation scale  6.  Follow pain management plan developed in collaboration with patient and interdisciplinary team (including palliative care or pain specialists if applicable)  Outcome: Progressing     Problem: Infection - Standard  Goal: Patient will remain free from infection  Description: Target End Date:  Prior to discharge or change in level of care    1.  Utilize Standard Precautions at all times to reduce the risk of transmission of microorganisms from both recognized and  unrecognized sources of infection  2.  Infection prevention handouts provided (general/device/diagnosis specific) and documented in Patient Education  3.  Educate patient and family/caregiver on isolation precautions if applicable  Outcome: Met     Problem: Knowledge Deficit  Goal: Knowledge of disease process/condition, treatment plan, diagnostic tests, and medications will improve  Outcome: Met   The patient is Stable - Low risk of patient condition declining or worsening    Shift Goals  Clinical Goals: pain control, safe mobility  Patient Goals: Pain control, rest, comfort  Family Goals: N/A    Progress made toward(s) clinical / shift goals:      Pt. Vital signs WDL.   Pt. Verbalized understanding on the care provided.   Pt. Rated pain as 7  from 1-10, 10 being the most painful. Pain medications given as ordered.

## 2024-10-02 NOTE — PROGRESS NOTES
"      Orthopaedic Progress Note    Interval changes:  Patient doing well    L ismael dressings are CDI  Cleared for DC to rehab by ortho pending medicine clearance    ROS - Patient denies any new issues.  Pain well controlled.    /66   Pulse 72   Temp 36.1 °C (96.9 °F) (Temporal)   Resp 16   Ht 1.727 m (5' 8\")   Wt 68.1 kg (150 lb 2.1 oz)   SpO2 96%     Patient seen and examined  No acute distress  Breathing non labored  RRR  L ismael dressings CDI, DNVI, moves all toes, cap refill <2 sec.    Recent Labs     09/30/24  0621 10/01/24  0409   WBC 5.7 6.3   RBC 2.71* 2.64*   HEMOGLOBIN 8.8* 8.7*   HEMATOCRIT 25.6* 24.8*   MCV 94.5 93.9   MCH 32.5 33.0   MCHC 34.4 35.1   RDW 42.5 42.7   PLATELETCT 280 286   MPV 10.1 9.5       Active Hospital Problems    Diagnosis     Advance care planning [Z71.89]     Closed left hip fracture, initial encounter ([facility]) [S72.002A]     Hyponatremia [E87.1]     Left displaced femoral neck fracture ([facility]) [S72.002A]        Assessment/Plan:  Patient doing well    L ismael dressings are CDI  Cleared for DC to rehab by ortho pending medicine clearance  POD#4 S/P Open treatment of left femoral fracture, proximal end, neck with prosthetic replacement   Wt bearing status - WBAT with posterior hip precautions  Wound care/Drains - Dressings to be left in place  Future Procedures - none   Lovenox: Start 9/28, Duration-until ambulatory > 150'  Sutures/Staples out- 14-21 days post operatively. Removal will completed by ortho mid levels only.  PT/OT-initiated  Antibiotics: Perioperative completed  DVT Prophylaxis- TEDS/SCDs/Foot pumps  Alvarado-not needed per ortho  Case Coordination for Discharge Planning - Disposition per therapy recs.     "

## 2024-10-03 ENCOUNTER — APPOINTMENT (OUTPATIENT)
Dept: OCCUPATIONAL THERAPY | Facility: REHABILITATION | Age: 74
DRG: 560 | End: 2024-10-03
Attending: PHYSICAL MEDICINE & REHABILITATION
Payer: COMMERCIAL

## 2024-10-03 ENCOUNTER — APPOINTMENT (OUTPATIENT)
Dept: PHYSICAL THERAPY | Facility: REHABILITATION | Age: 74
DRG: 560 | End: 2024-10-03
Attending: PHYSICAL MEDICINE & REHABILITATION
Payer: COMMERCIAL

## 2024-10-03 LAB
ALBUMIN SERPL BCP-MCNC: 3.5 G/DL (ref 3.2–4.9)
ALBUMIN/GLOB SERPL: 1.5 G/DL
ALP SERPL-CCNC: 68 U/L (ref 30–99)
ALT SERPL-CCNC: 16 U/L (ref 2–50)
ANION GAP SERPL CALC-SCNC: 11 MMOL/L (ref 7–16)
AST SERPL-CCNC: 26 U/L (ref 12–45)
BASOPHILS # BLD AUTO: 1.2 % (ref 0–1.8)
BASOPHILS # BLD: 0.06 K/UL (ref 0–0.12)
BILIRUB SERPL-MCNC: 0.7 MG/DL (ref 0.1–1.5)
BUN SERPL-MCNC: 10 MG/DL (ref 8–22)
CALCIUM ALBUM COR SERPL-MCNC: 9.5 MG/DL (ref 8.5–10.5)
CALCIUM SERPL-MCNC: 9.1 MG/DL (ref 8.5–10.5)
CHLORIDE SERPL-SCNC: 97 MMOL/L (ref 96–112)
CO2 SERPL-SCNC: 25 MMOL/L (ref 20–33)
CREAT SERPL-MCNC: 0.68 MG/DL (ref 0.5–1.4)
EOSINOPHIL # BLD AUTO: 0.17 K/UL (ref 0–0.51)
EOSINOPHIL NFR BLD: 3.4 % (ref 0–6.9)
ERYTHROCYTE [DISTWIDTH] IN BLOOD BY AUTOMATED COUNT: 42.6 FL (ref 35.9–50)
GFR SERPLBLD CREATININE-BSD FMLA CKD-EPI: 91 ML/MIN/1.73 M 2
GLOBULIN SER CALC-MCNC: 2.4 G/DL (ref 1.9–3.5)
GLUCOSE SERPL-MCNC: 105 MG/DL (ref 65–99)
HCT VFR BLD AUTO: 26.9 % (ref 37–47)
HGB BLD-MCNC: 9.2 G/DL (ref 12–16)
IMM GRANULOCYTES # BLD AUTO: 0.02 K/UL (ref 0–0.11)
IMM GRANULOCYTES NFR BLD AUTO: 0.4 % (ref 0–0.9)
LYMPHOCYTES # BLD AUTO: 1.09 K/UL (ref 1–4.8)
LYMPHOCYTES NFR BLD: 21.5 % (ref 22–41)
MAGNESIUM SERPL-MCNC: 2.1 MG/DL (ref 1.5–2.5)
MCH RBC QN AUTO: 32.2 PG (ref 27–33)
MCHC RBC AUTO-ENTMCNC: 34.2 G/DL (ref 32.2–35.5)
MCV RBC AUTO: 94.1 FL (ref 81.4–97.8)
MONOCYTES # BLD AUTO: 0.54 K/UL (ref 0–0.85)
MONOCYTES NFR BLD AUTO: 10.7 % (ref 0–13.4)
NEUTROPHILS # BLD AUTO: 3.19 K/UL (ref 1.82–7.42)
NEUTROPHILS NFR BLD: 62.8 % (ref 44–72)
NRBC # BLD AUTO: 0 K/UL
NRBC BLD-RTO: 0 /100 WBC (ref 0–0.2)
PLATELET # BLD AUTO: 412 K/UL (ref 164–446)
PMV BLD AUTO: 10.1 FL (ref 9–12.9)
POTASSIUM SERPL-SCNC: 3.8 MMOL/L (ref 3.6–5.5)
PROT SERPL-MCNC: 5.9 G/DL (ref 6–8.2)
RBC # BLD AUTO: 2.86 M/UL (ref 4.2–5.4)
SODIUM SERPL-SCNC: 133 MMOL/L (ref 135–145)
WBC # BLD AUTO: 5.1 K/UL (ref 4.8–10.8)

## 2024-10-03 PROCEDURE — 99232 SBSQ HOSP IP/OBS MODERATE 35: CPT | Performed by: PHYSICAL MEDICINE & REHABILITATION

## 2024-10-03 PROCEDURE — 97110 THERAPEUTIC EXERCISES: CPT

## 2024-10-03 PROCEDURE — 83735 ASSAY OF MAGNESIUM: CPT

## 2024-10-03 PROCEDURE — 97165 OT EVAL LOW COMPLEX 30 MIN: CPT

## 2024-10-03 PROCEDURE — 97535 SELF CARE MNGMENT TRAINING: CPT

## 2024-10-03 PROCEDURE — 85025 COMPLETE CBC W/AUTO DIFF WBC: CPT

## 2024-10-03 PROCEDURE — 97161 PT EVAL LOW COMPLEX 20 MIN: CPT

## 2024-10-03 PROCEDURE — 80053 COMPREHEN METABOLIC PANEL: CPT

## 2024-10-03 PROCEDURE — 97530 THERAPEUTIC ACTIVITIES: CPT

## 2024-10-03 PROCEDURE — 97116 GAIT TRAINING THERAPY: CPT

## 2024-10-03 PROCEDURE — 770010 HCHG ROOM/CARE - REHAB SEMI PRIVAT*

## 2024-10-03 PROCEDURE — 700102 HCHG RX REV CODE 250 W/ 637 OVERRIDE(OP): Performed by: PHYSICAL MEDICINE & REHABILITATION

## 2024-10-03 PROCEDURE — 36415 COLL VENOUS BLD VENIPUNCTURE: CPT

## 2024-10-03 PROCEDURE — A9270 NON-COVERED ITEM OR SERVICE: HCPCS | Performed by: PHYSICAL MEDICINE & REHABILITATION

## 2024-10-03 RX ADMIN — OXYCODONE HYDROCHLORIDE 10 MG: 10 TABLET ORAL at 09:26

## 2024-10-03 RX ADMIN — SENNOSIDES AND DOCUSATE SODIUM 2 TABLET: 50; 8.6 TABLET ORAL at 20:32

## 2024-10-03 RX ADMIN — OXYCODONE HYDROCHLORIDE 5 MG: 5 TABLET ORAL at 05:14

## 2024-10-03 RX ADMIN — OXYCODONE HYDROCHLORIDE 10 MG: 10 TABLET ORAL at 20:32

## 2024-10-03 RX ADMIN — MAGNESIUM HYDROXIDE 30 ML: 1200 LIQUID ORAL at 17:59

## 2024-10-03 RX ADMIN — OXYCODONE HYDROCHLORIDE 10 MG: 10 TABLET ORAL at 15:56

## 2024-10-03 RX ADMIN — ACETAMINOPHEN 650 MG: 325 TABLET ORAL at 05:14

## 2024-10-03 ASSESSMENT — BRIEF INTERVIEW FOR MENTAL STATUS (BIMS)
WHAT MONTH IS IT: ACCURATE WITHIN 5 DAYS
BIMS SUMMARY SCORE: 15
WHAT YEAR IS IT: CORRECT
ASKED TO RECALL SOCK: YES, NO CUE REQUIRED
ASKED TO RECALL BLUE: YES, NO CUE REQUIRED
ASKED TO RECALL BED: YES, NO CUE REQUIRED
INITIAL REPETITION OF BED BLUE SOCK - FIRST ATTEMPT: 3
WHAT DAY OF THE WEEK IS IT: CORRECT

## 2024-10-03 ASSESSMENT — GAIT ASSESSMENTS
ASSISTIVE DEVICE: FRONT WHEEL WALKER
GAIT LEVEL OF ASSIST: CONTACT GUARD ASSIST
DISTANCE (FEET): 165
DEVIATION: ANTALGIC;BRADYKINETIC
ASSISTIVE DEVICE: FRONT WHEEL WALKER
DISTANCE (FEET): 165
GAIT LEVEL OF ASSIST: CONTACT GUARD ASSIST
DEVIATION: ANTALGIC;BRADYKINETIC

## 2024-10-03 ASSESSMENT — ACTIVITIES OF DAILY LIVING (ADL)
TOILETING: INDEPENDENT
BED_CHAIR_WHEELCHAIR_TRANSFER_DESCRIPTION: ADAPTIVE EQUIPMENT;INCREASED TIME;INITIAL PREPARATION FOR TASK;SET-UP OF EQUIPMENT;SUPERVISION FOR SAFETY
BED_CHAIR_WHEELCHAIR_TRANSFER_DESCRIPTION: ADAPTIVE EQUIPMENT;SET-UP OF EQUIPMENT;SUPERVISION FOR SAFETY

## 2024-10-03 ASSESSMENT — PAIN DESCRIPTION - PAIN TYPE: TYPE: ACUTE PAIN

## 2024-10-04 ENCOUNTER — APPOINTMENT (OUTPATIENT)
Dept: OCCUPATIONAL THERAPY | Facility: REHABILITATION | Age: 74
DRG: 560 | End: 2024-10-04
Attending: PHYSICAL MEDICINE & REHABILITATION
Payer: COMMERCIAL

## 2024-10-04 ENCOUNTER — APPOINTMENT (OUTPATIENT)
Dept: PHYSICAL THERAPY | Facility: REHABILITATION | Age: 74
DRG: 560 | End: 2024-10-04
Attending: PHYSICAL MEDICINE & REHABILITATION
Payer: COMMERCIAL

## 2024-10-04 PROCEDURE — 99232 SBSQ HOSP IP/OBS MODERATE 35: CPT | Performed by: PHYSICAL MEDICINE & REHABILITATION

## 2024-10-04 PROCEDURE — A9270 NON-COVERED ITEM OR SERVICE: HCPCS | Performed by: PHYSICAL MEDICINE & REHABILITATION

## 2024-10-04 PROCEDURE — 97530 THERAPEUTIC ACTIVITIES: CPT

## 2024-10-04 PROCEDURE — 97110 THERAPEUTIC EXERCISES: CPT

## 2024-10-04 PROCEDURE — 700102 HCHG RX REV CODE 250 W/ 637 OVERRIDE(OP): Performed by: PHYSICAL MEDICINE & REHABILITATION

## 2024-10-04 PROCEDURE — 97535 SELF CARE MNGMENT TRAINING: CPT

## 2024-10-04 PROCEDURE — 770010 HCHG ROOM/CARE - REHAB SEMI PRIVAT*

## 2024-10-04 PROCEDURE — 97116 GAIT TRAINING THERAPY: CPT

## 2024-10-04 RX ADMIN — OXYCODONE HYDROCHLORIDE 5 MG: 5 TABLET ORAL at 20:38

## 2024-10-04 RX ADMIN — SENNOSIDES AND DOCUSATE SODIUM 2 TABLET: 50; 8.6 TABLET ORAL at 08:28

## 2024-10-04 RX ADMIN — OXYCODONE HYDROCHLORIDE 5 MG: 5 TABLET ORAL at 16:30

## 2024-10-04 RX ADMIN — SENNOSIDES AND DOCUSATE SODIUM 2 TABLET: 50; 8.6 TABLET ORAL at 20:38

## 2024-10-04 RX ADMIN — OXYCODONE HYDROCHLORIDE 5 MG: 5 TABLET ORAL at 08:27

## 2024-10-04 ASSESSMENT — ACTIVITIES OF DAILY LIVING (ADL)
TOILET_TRANSFER_DESCRIPTION: ADAPTIVE EQUIPMENT;GRAB BAR;INCREASED TIME;SUPERVISION FOR SAFETY
BED_CHAIR_WHEELCHAIR_TRANSFER_DESCRIPTION: ADAPTIVE EQUIPMENT;SET-UP OF EQUIPMENT;SUPERVISION FOR SAFETY;VERBAL CUEING
BED_CHAIR_WHEELCHAIR_TRANSFER_DESCRIPTION: INCREASED TIME;ADAPTIVE EQUIPMENT;SUPERVISION FOR SAFETY
TOILET_TRANSFER_DESCRIPTION: GRAB BAR;SUPERVISION FOR SAFETY
BED_CHAIR_WHEELCHAIR_TRANSFER_DESCRIPTION: ADAPTIVE EQUIPMENT;INCREASED TIME;SET-UP OF EQUIPMENT;SUPERVISION FOR SAFETY
BED_CHAIR_WHEELCHAIR_TRANSFER_DESCRIPTION: ADAPTIVE EQUIPMENT;SET-UP OF EQUIPMENT;SUPERVISION FOR SAFETY;VERBAL CUEING
TOILETING_LEVEL_OF_ASSIST_DESCRIPTION: ADAPTIVE EQUIPMENT;GRAB BAR;INCREASED TIME;SUPERVISION FOR SAFETY

## 2024-10-04 ASSESSMENT — GAIT ASSESSMENTS
DISTANCE (FEET): 200
DISTANCE (FEET): 150
ASSISTIVE DEVICE: FRONT WHEEL WALKER
GAIT LEVEL OF ASSIST: CONTACT GUARD ASSIST
DEVIATION: ANTALGIC
DEVIATION: ANTALGIC;BRADYKINETIC
ASSISTIVE DEVICE: FRONT WHEEL WALKER
GAIT LEVEL OF ASSIST: CONTACT GUARD ASSIST

## 2024-10-05 ENCOUNTER — APPOINTMENT (OUTPATIENT)
Dept: PHYSICAL THERAPY | Facility: REHABILITATION | Age: 74
DRG: 560 | End: 2024-10-05
Attending: PHYSICAL MEDICINE & REHABILITATION
Payer: COMMERCIAL

## 2024-10-05 ENCOUNTER — APPOINTMENT (OUTPATIENT)
Dept: OCCUPATIONAL THERAPY | Facility: REHABILITATION | Age: 74
DRG: 560 | End: 2024-10-05
Attending: PHYSICAL MEDICINE & REHABILITATION
Payer: COMMERCIAL

## 2024-10-05 PROCEDURE — A9270 NON-COVERED ITEM OR SERVICE: HCPCS | Performed by: PHYSICAL MEDICINE & REHABILITATION

## 2024-10-05 PROCEDURE — 97116 GAIT TRAINING THERAPY: CPT

## 2024-10-05 PROCEDURE — 97530 THERAPEUTIC ACTIVITIES: CPT

## 2024-10-05 PROCEDURE — 770010 HCHG ROOM/CARE - REHAB SEMI PRIVAT*

## 2024-10-05 PROCEDURE — 97535 SELF CARE MNGMENT TRAINING: CPT

## 2024-10-05 PROCEDURE — 97110 THERAPEUTIC EXERCISES: CPT

## 2024-10-05 PROCEDURE — 700102 HCHG RX REV CODE 250 W/ 637 OVERRIDE(OP): Performed by: PHYSICAL MEDICINE & REHABILITATION

## 2024-10-05 RX ADMIN — SENNOSIDES AND DOCUSATE SODIUM 2 TABLET: 50; 8.6 TABLET ORAL at 20:33

## 2024-10-05 RX ADMIN — SENNOSIDES AND DOCUSATE SODIUM 2 TABLET: 50; 8.6 TABLET ORAL at 09:10

## 2024-10-05 RX ADMIN — OXYCODONE HYDROCHLORIDE 5 MG: 5 TABLET ORAL at 11:08

## 2024-10-05 RX ADMIN — OXYCODONE HYDROCHLORIDE 5 MG: 5 TABLET ORAL at 20:33

## 2024-10-05 ASSESSMENT — PATIENT HEALTH QUESTIONNAIRE - PHQ9
1. LITTLE INTEREST OR PLEASURE IN DOING THINGS: NOT AT ALL
2. FEELING DOWN, DEPRESSED, IRRITABLE, OR HOPELESS: NOT AT ALL
SUM OF ALL RESPONSES TO PHQ9 QUESTIONS 1 AND 2: 0

## 2024-10-05 ASSESSMENT — FIBROSIS 4 INDEX: FIB4 SCORE: 1.15

## 2024-10-05 ASSESSMENT — GAIT ASSESSMENTS
DEVIATION: ANTALGIC
GAIT LEVEL OF ASSIST: CONTACT GUARD ASSIST
ASSISTIVE DEVICE: 4 WHEEL WALKER;SINGLE POINT CANE
GAIT LEVEL OF ASSIST: MODIFIED INDEPENDENT
DEVIATION: ANTALGIC
ASSISTIVE DEVICE: 4 WHEEL WALKER
DISTANCE (FEET): 150
DISTANCE (FEET): 500

## 2024-10-05 ASSESSMENT — ACTIVITIES OF DAILY LIVING (ADL)
BED_CHAIR_WHEELCHAIR_TRANSFER_DESCRIPTION: INCREASED TIME;INITIAL PREPARATION FOR TASK;SET-UP OF EQUIPMENT;SUPERVISION FOR SAFETY;VERBAL CUEING
TOILET_TRANSFER_DESCRIPTION: ADAPTIVE EQUIPMENT

## 2024-10-05 ASSESSMENT — PAIN DESCRIPTION - PAIN TYPE: TYPE: ACUTE PAIN

## 2024-10-06 PROCEDURE — A9270 NON-COVERED ITEM OR SERVICE: HCPCS | Performed by: PHYSICAL MEDICINE & REHABILITATION

## 2024-10-06 PROCEDURE — 700111 HCHG RX REV CODE 636 W/ 250 OVERRIDE (IP): Mod: JZ | Performed by: PHYSICAL MEDICINE & REHABILITATION

## 2024-10-06 PROCEDURE — 700102 HCHG RX REV CODE 250 W/ 637 OVERRIDE(OP): Performed by: PHYSICAL MEDICINE & REHABILITATION

## 2024-10-06 PROCEDURE — 770010 HCHG ROOM/CARE - REHAB SEMI PRIVAT*

## 2024-10-06 RX ADMIN — OXYCODONE HYDROCHLORIDE 5 MG: 5 TABLET ORAL at 18:31

## 2024-10-06 RX ADMIN — SENNOSIDES AND DOCUSATE SODIUM 2 TABLET: 50; 8.6 TABLET ORAL at 19:35

## 2024-10-06 ASSESSMENT — FIBROSIS 4 INDEX: FIB4 SCORE: 1.15

## 2024-10-07 ENCOUNTER — APPOINTMENT (OUTPATIENT)
Dept: PHYSICAL THERAPY | Facility: REHABILITATION | Age: 74
DRG: 560 | End: 2024-10-07
Attending: PHYSICAL MEDICINE & REHABILITATION
Payer: COMMERCIAL

## 2024-10-07 ENCOUNTER — APPOINTMENT (OUTPATIENT)
Dept: OCCUPATIONAL THERAPY | Facility: REHABILITATION | Age: 74
DRG: 560 | End: 2024-10-07
Attending: PHYSICAL MEDICINE & REHABILITATION
Payer: COMMERCIAL

## 2024-10-07 PROCEDURE — 97116 GAIT TRAINING THERAPY: CPT

## 2024-10-07 PROCEDURE — 97112 NEUROMUSCULAR REEDUCATION: CPT

## 2024-10-07 PROCEDURE — 97530 THERAPEUTIC ACTIVITIES: CPT

## 2024-10-07 PROCEDURE — A9270 NON-COVERED ITEM OR SERVICE: HCPCS | Performed by: PHYSICAL MEDICINE & REHABILITATION

## 2024-10-07 PROCEDURE — 99232 SBSQ HOSP IP/OBS MODERATE 35: CPT | Performed by: PHYSICAL MEDICINE & REHABILITATION

## 2024-10-07 PROCEDURE — 700102 HCHG RX REV CODE 250 W/ 637 OVERRIDE(OP): Performed by: PHYSICAL MEDICINE & REHABILITATION

## 2024-10-07 PROCEDURE — 97110 THERAPEUTIC EXERCISES: CPT

## 2024-10-07 PROCEDURE — 770010 HCHG ROOM/CARE - REHAB SEMI PRIVAT*

## 2024-10-07 RX ORDER — ASPIRIN 325 MG
325 TABLET, DELAYED RELEASE (ENTERIC COATED) ORAL 2 TIMES DAILY
Qty: 60 TABLET | Refills: 0 | Status: SHIPPED | OUTPATIENT
Start: 2024-10-07 | End: 2024-10-07

## 2024-10-07 RX ORDER — POLYETHYLENE GLYCOL 3350 17 G/17G
17 POWDER, FOR SOLUTION ORAL
Qty: 5 PACKET | Refills: 0 | Status: SHIPPED | OUTPATIENT
Start: 2024-10-07 | End: 2024-10-07

## 2024-10-07 RX ORDER — OXYCODONE HYDROCHLORIDE 5 MG/1
5 TABLET ORAL EVERY 6 HOURS PRN
Qty: 28 TABLET | Refills: 0 | Status: SHIPPED | OUTPATIENT
Start: 2024-10-07 | End: 2024-10-07

## 2024-10-07 RX ORDER — OXYCODONE HYDROCHLORIDE 5 MG/1
5 TABLET ORAL EVERY 6 HOURS PRN
Qty: 28 TABLET | Refills: 0 | Status: SHIPPED | OUTPATIENT
Start: 2024-10-07 | End: 2024-10-08

## 2024-10-07 RX ORDER — ASPIRIN 325 MG
325 TABLET, DELAYED RELEASE (ENTERIC COATED) ORAL 2 TIMES DAILY
Qty: 60 TABLET | Refills: 0 | Status: SHIPPED | OUTPATIENT
Start: 2024-10-07

## 2024-10-07 RX ORDER — POLYETHYLENE GLYCOL 3350 17 G/17G
17 POWDER, FOR SOLUTION ORAL
Qty: 5 PACKET | Refills: 0 | Status: SHIPPED | OUTPATIENT
Start: 2024-10-07

## 2024-10-07 RX ADMIN — OXYCODONE HYDROCHLORIDE 5 MG: 5 TABLET ORAL at 11:30

## 2024-10-07 RX ADMIN — OXYCODONE HYDROCHLORIDE 5 MG: 5 TABLET ORAL at 06:58

## 2024-10-07 RX ADMIN — SENNOSIDES AND DOCUSATE SODIUM 2 TABLET: 50; 8.6 TABLET ORAL at 20:36

## 2024-10-07 RX ADMIN — OXYCODONE HYDROCHLORIDE 5 MG: 5 TABLET ORAL at 20:39

## 2024-10-07 RX ADMIN — OXYCODONE HYDROCHLORIDE 5 MG: 5 TABLET ORAL at 17:24

## 2024-10-07 ASSESSMENT — GAIT ASSESSMENTS
DISTANCE (FEET): 500
DISTANCE (FEET): 600
GAIT LEVEL OF ASSIST: MODIFIED INDEPENDENT
DEVIATION: ANTALGIC
GAIT LEVEL OF ASSIST: MODIFIED INDEPENDENT
DEVIATION: ANTALGIC
ASSISTIVE DEVICE: 4 WHEEL WALKER

## 2024-10-07 ASSESSMENT — ACTIVITIES OF DAILY LIVING (ADL)
TOILET_TRANSFER_DESCRIPTION: ADAPTIVE EQUIPMENT
BED_CHAIR_WHEELCHAIR_TRANSFER_DESCRIPTION: OTHER (COMMENT)
TUB_SHOWER_TRANSFER_DESCRIPTION: SUPERVISION FOR SAFETY;VERBAL CUEING
SHOWER_TRANSFER_LEVEL_OF_ASSIST: ABLE TO COMPLETE SHOWER TRANSFER WITHOUT ASSIST
BED_CHAIR_WHEELCHAIR_TRANSFER_DESCRIPTION: OTHER (COMMENT)
TOILETING_LEVEL_OF_ASSIST: ABLE TO COMPLETE TOILETING WITHOUT ASSIST
TOILET_TRANSFER_LEVEL_OF_ASSIST: ABLE TO COMPLETE TOILET TRANSFER WITHOUT ASSIST

## 2024-10-07 ASSESSMENT — BRIEF INTERVIEW FOR MENTAL STATUS (BIMS)
ASKED TO RECALL SOCK: YES, NO CUE REQUIRED
WHAT MONTH IS IT: ACCURATE WITHIN 5 DAYS
INITIAL REPETITION OF BED BLUE SOCK - FIRST ATTEMPT: 3
WHAT YEAR IS IT: CORRECT
BIMS SUMMARY SCORE: 15
ASKED TO RECALL BLUE: YES, NO CUE REQUIRED
ASKED TO RECALL BED: YES, NO CUE REQUIRED
WHAT DAY OF THE WEEK IS IT: CORRECT

## 2024-10-08 VITALS
HEIGHT: 70 IN | OXYGEN SATURATION: 98 % | HEART RATE: 69 BPM | SYSTOLIC BLOOD PRESSURE: 106 MMHG | TEMPERATURE: 97.6 F | BODY MASS INDEX: 22.05 KG/M2 | RESPIRATION RATE: 18 BRPM | WEIGHT: 154 LBS | DIASTOLIC BLOOD PRESSURE: 56 MMHG

## 2024-10-08 PROCEDURE — 700102 HCHG RX REV CODE 250 W/ 637 OVERRIDE(OP): Performed by: PHYSICAL MEDICINE & REHABILITATION

## 2024-10-08 PROCEDURE — A9270 NON-COVERED ITEM OR SERVICE: HCPCS | Performed by: PHYSICAL MEDICINE & REHABILITATION

## 2024-10-08 PROCEDURE — 99239 HOSP IP/OBS DSCHRG MGMT >30: CPT | Performed by: PHYSICAL MEDICINE & REHABILITATION

## 2024-10-08 RX ORDER — OXYCODONE HYDROCHLORIDE 5 MG/1
5 TABLET ORAL EVERY 6 HOURS PRN
Qty: 28 TABLET | Refills: 0 | Status: SHIPPED | OUTPATIENT
Start: 2024-10-08 | End: 2024-10-15

## 2024-10-08 RX ADMIN — OXYCODONE HYDROCHLORIDE 10 MG: 10 TABLET ORAL at 05:58

## 2024-10-25 ENCOUNTER — HOSPITAL ENCOUNTER (OUTPATIENT)
Dept: RADIOLOGY | Facility: MEDICAL CENTER | Age: 74
End: 2024-10-25

## 2024-10-25 ENCOUNTER — HOSPITAL ENCOUNTER (INPATIENT)
Facility: MEDICAL CENTER | Age: 74
LOS: 4 days | End: 2024-10-29
Attending: EMERGENCY MEDICINE | Admitting: STUDENT IN AN ORGANIZED HEALTH CARE EDUCATION/TRAINING PROGRAM
Payer: COMMERCIAL

## 2024-10-25 DIAGNOSIS — S72.002A CLOSED LEFT HIP FRACTURE, INITIAL ENCOUNTER (HCC): ICD-10-CM

## 2024-10-25 DIAGNOSIS — S72.002S HIP FRACTURE, LEFT, SEQUELA: ICD-10-CM

## 2024-10-25 DIAGNOSIS — L08.9 WOUND INFECTION: ICD-10-CM

## 2024-10-25 DIAGNOSIS — T81.49XA LEFT HIP POSTOPERATIVE WOUND INFECTION: ICD-10-CM

## 2024-10-25 DIAGNOSIS — Z91.89 AT RISK FOR CONSTIPATION: ICD-10-CM

## 2024-10-25 DIAGNOSIS — T14.8XXA WOUND INFECTION: ICD-10-CM

## 2024-10-25 LAB
ALBUMIN SERPL BCP-MCNC: 4.1 G/DL (ref 3.2–4.9)
ALBUMIN/GLOB SERPL: 1.8 G/DL
ALP SERPL-CCNC: 90 U/L (ref 30–99)
ALT SERPL-CCNC: 10 U/L (ref 2–50)
ANION GAP SERPL CALC-SCNC: 15 MMOL/L (ref 7–16)
AST SERPL-CCNC: 14 U/L (ref 12–45)
BASOPHILS # BLD AUTO: 0.5 % (ref 0–1.8)
BASOPHILS # BLD: 0.03 K/UL (ref 0–0.12)
BILIRUB SERPL-MCNC: 0.4 MG/DL (ref 0.1–1.5)
BUN SERPL-MCNC: 11 MG/DL (ref 8–22)
CALCIUM ALBUM COR SERPL-MCNC: 9.5 MG/DL (ref 8.5–10.5)
CALCIUM SERPL-MCNC: 9.6 MG/DL (ref 8.5–10.5)
CHLORIDE SERPL-SCNC: 102 MMOL/L (ref 96–112)
CO2 SERPL-SCNC: 23 MMOL/L (ref 20–33)
CREAT SERPL-MCNC: 0.69 MG/DL (ref 0.5–1.4)
CRP SERPL HS-MCNC: 0.79 MG/DL (ref 0–0.75)
EOSINOPHIL # BLD AUTO: 0.17 K/UL (ref 0–0.51)
EOSINOPHIL NFR BLD: 2.6 % (ref 0–6.9)
ERYTHROCYTE [DISTWIDTH] IN BLOOD BY AUTOMATED COUNT: 45 FL (ref 35.9–50)
ERYTHROCYTE [SEDIMENTATION RATE] IN BLOOD BY WESTERGREN METHOD: 15 MM/HOUR (ref 0–25)
GFR SERPLBLD CREATININE-BSD FMLA CKD-EPI: 91 ML/MIN/1.73 M 2
GLOBULIN SER CALC-MCNC: 2.3 G/DL (ref 1.9–3.5)
GLUCOSE SERPL-MCNC: 105 MG/DL (ref 65–99)
HCT VFR BLD AUTO: 35.3 % (ref 37–47)
HGB BLD-MCNC: 11.9 G/DL (ref 12–16)
IMM GRANULOCYTES # BLD AUTO: 0.02 K/UL (ref 0–0.11)
IMM GRANULOCYTES NFR BLD AUTO: 0.3 % (ref 0–0.9)
LYMPHOCYTES # BLD AUTO: 1.61 K/UL (ref 1–4.8)
LYMPHOCYTES NFR BLD: 25 % (ref 22–41)
MCH RBC QN AUTO: 31.5 PG (ref 27–33)
MCHC RBC AUTO-ENTMCNC: 33.7 G/DL (ref 32.2–35.5)
MCV RBC AUTO: 93.4 FL (ref 81.4–97.8)
MONOCYTES # BLD AUTO: 0.38 K/UL (ref 0–0.85)
MONOCYTES NFR BLD AUTO: 5.9 % (ref 0–13.4)
NEUTROPHILS # BLD AUTO: 4.22 K/UL (ref 1.82–7.42)
NEUTROPHILS NFR BLD: 65.7 % (ref 44–72)
NRBC # BLD AUTO: 0 K/UL
NRBC BLD-RTO: 0 /100 WBC (ref 0–0.2)
PLATELET # BLD AUTO: 354 K/UL (ref 164–446)
PMV BLD AUTO: 9.6 FL (ref 9–12.9)
POTASSIUM SERPL-SCNC: 3.6 MMOL/L (ref 3.6–5.5)
PROT SERPL-MCNC: 6.4 G/DL (ref 6–8.2)
RBC # BLD AUTO: 3.78 M/UL (ref 4.2–5.4)
SODIUM SERPL-SCNC: 140 MMOL/L (ref 135–145)
WBC # BLD AUTO: 6.4 K/UL (ref 4.8–10.8)

## 2024-10-25 PROCEDURE — 85652 RBC SED RATE AUTOMATED: CPT

## 2024-10-25 PROCEDURE — 85025 COMPLETE CBC W/AUTO DIFF WBC: CPT

## 2024-10-25 PROCEDURE — 80053 COMPREHEN METABOLIC PANEL: CPT

## 2024-10-25 PROCEDURE — 700105 HCHG RX REV CODE 258: Performed by: STUDENT IN AN ORGANIZED HEALTH CARE EDUCATION/TRAINING PROGRAM

## 2024-10-25 PROCEDURE — 700102 HCHG RX REV CODE 250 W/ 637 OVERRIDE(OP): Performed by: STUDENT IN AN ORGANIZED HEALTH CARE EDUCATION/TRAINING PROGRAM

## 2024-10-25 PROCEDURE — 99223 1ST HOSP IP/OBS HIGH 75: CPT | Mod: AI | Performed by: STUDENT IN AN ORGANIZED HEALTH CARE EDUCATION/TRAINING PROGRAM

## 2024-10-25 PROCEDURE — 86140 C-REACTIVE PROTEIN: CPT

## 2024-10-25 PROCEDURE — 36415 COLL VENOUS BLD VENIPUNCTURE: CPT

## 2024-10-25 PROCEDURE — A9270 NON-COVERED ITEM OR SERVICE: HCPCS | Performed by: STUDENT IN AN ORGANIZED HEALTH CARE EDUCATION/TRAINING PROGRAM

## 2024-10-25 PROCEDURE — 770001 HCHG ROOM/CARE - MED/SURG/GYN PRIV*

## 2024-10-25 PROCEDURE — 700111 HCHG RX REV CODE 636 W/ 250 OVERRIDE (IP): Performed by: STUDENT IN AN ORGANIZED HEALTH CARE EDUCATION/TRAINING PROGRAM

## 2024-10-25 PROCEDURE — 99285 EMERGENCY DEPT VISIT HI MDM: CPT

## 2024-10-25 RX ORDER — AMOXICILLIN 250 MG
2 CAPSULE ORAL EVERY EVENING
Status: DISCONTINUED | OUTPATIENT
Start: 2024-10-25 | End: 2024-10-29 | Stop reason: HOSPADM

## 2024-10-25 RX ORDER — ACETAMINOPHEN 325 MG/1
650 TABLET ORAL EVERY 6 HOURS PRN
Status: DISCONTINUED | OUTPATIENT
Start: 2024-10-25 | End: 2024-10-29 | Stop reason: HOSPADM

## 2024-10-25 RX ORDER — OXYCODONE HYDROCHLORIDE 10 MG/1
10 TABLET ORAL
Status: DISCONTINUED | OUTPATIENT
Start: 2024-10-25 | End: 2024-10-29 | Stop reason: HOSPADM

## 2024-10-25 RX ORDER — OXYCODONE HYDROCHLORIDE 5 MG/1
5 TABLET ORAL
Status: DISCONTINUED | OUTPATIENT
Start: 2024-10-25 | End: 2024-10-29 | Stop reason: HOSPADM

## 2024-10-25 RX ORDER — ONDANSETRON 4 MG/1
4 TABLET, ORALLY DISINTEGRATING ORAL EVERY 4 HOURS PRN
Status: DISCONTINUED | OUTPATIENT
Start: 2024-10-25 | End: 2024-10-29 | Stop reason: HOSPADM

## 2024-10-25 RX ORDER — ONDANSETRON 2 MG/ML
4 INJECTION INTRAMUSCULAR; INTRAVENOUS EVERY 4 HOURS PRN
Status: DISCONTINUED | OUTPATIENT
Start: 2024-10-25 | End: 2024-10-29 | Stop reason: HOSPADM

## 2024-10-25 RX ORDER — POLYETHYLENE GLYCOL 3350 17 G/17G
1 POWDER, FOR SOLUTION ORAL
Status: DISCONTINUED | OUTPATIENT
Start: 2024-10-25 | End: 2024-10-29 | Stop reason: HOSPADM

## 2024-10-25 RX ORDER — HYDROMORPHONE HYDROCHLORIDE 1 MG/ML
0.5 INJECTION, SOLUTION INTRAMUSCULAR; INTRAVENOUS; SUBCUTANEOUS
Status: DISCONTINUED | OUTPATIENT
Start: 2024-10-25 | End: 2024-10-27

## 2024-10-25 RX ADMIN — SENNOSIDES AND DOCUSATE SODIUM 2 TABLET: 50; 8.6 TABLET ORAL at 20:04

## 2024-10-25 RX ADMIN — CEFAZOLIN 2 G: 10 INJECTION, POWDER, FOR SOLUTION INTRAVENOUS at 21:46

## 2024-10-25 ASSESSMENT — PAIN DESCRIPTION - PAIN TYPE
TYPE: ACUTE PAIN
TYPE: ACUTE PAIN

## 2024-10-25 ASSESSMENT — FIBROSIS 4 INDEX: FIB4 SCORE: 1.15

## 2024-10-26 ENCOUNTER — ANESTHESIA EVENT (OUTPATIENT)
Dept: SURGERY | Facility: MEDICAL CENTER | Age: 74
End: 2024-10-26
Payer: COMMERCIAL

## 2024-10-26 ENCOUNTER — ANESTHESIA (OUTPATIENT)
Dept: SURGERY | Facility: MEDICAL CENTER | Age: 74
End: 2024-10-26
Payer: COMMERCIAL

## 2024-10-26 ENCOUNTER — APPOINTMENT (OUTPATIENT)
Dept: RADIOLOGY | Facility: MEDICAL CENTER | Age: 74
End: 2024-10-26
Attending: STUDENT IN AN ORGANIZED HEALTH CARE EDUCATION/TRAINING PROGRAM
Payer: COMMERCIAL

## 2024-10-26 LAB
FUNGUS SPEC FUNGUS STN: NORMAL
GRAM STN SPEC: NORMAL
SCCMEC + MECA PNL NOSE NAA+PROBE: NEGATIVE
SIGNIFICANT IND 70042: NORMAL
SIGNIFICANT IND 70042: NORMAL
SITE SITE: NORMAL
SITE SITE: NORMAL
SOURCE SOURCE: NORMAL
SOURCE SOURCE: NORMAL

## 2024-10-26 PROCEDURE — 160028 HCHG SURGERY MINUTES - 1ST 30 MINS LEVEL 3: Performed by: STUDENT IN AN ORGANIZED HEALTH CARE EDUCATION/TRAINING PROGRAM

## 2024-10-26 PROCEDURE — 87641 MR-STAPH DNA AMP PROBE: CPT

## 2024-10-26 PROCEDURE — 87015 SPECIMEN INFECT AGNT CONCNTJ: CPT

## 2024-10-26 PROCEDURE — 700111 HCHG RX REV CODE 636 W/ 250 OVERRIDE (IP): Performed by: STUDENT IN AN ORGANIZED HEALTH CARE EDUCATION/TRAINING PROGRAM

## 2024-10-26 PROCEDURE — 700105 HCHG RX REV CODE 258: Performed by: STUDENT IN AN ORGANIZED HEALTH CARE EDUCATION/TRAINING PROGRAM

## 2024-10-26 PROCEDURE — 36415 COLL VENOUS BLD VENIPUNCTURE: CPT

## 2024-10-26 PROCEDURE — 160048 HCHG OR STATISTICAL LEVEL 1-5: Performed by: STUDENT IN AN ORGANIZED HEALTH CARE EDUCATION/TRAINING PROGRAM

## 2024-10-26 PROCEDURE — 87205 SMEAR GRAM STAIN: CPT

## 2024-10-26 PROCEDURE — 87147 CULTURE TYPE IMMUNOLOGIC: CPT

## 2024-10-26 PROCEDURE — 160035 HCHG PACU - 1ST 60 MINS PHASE I: Performed by: STUDENT IN AN ORGANIZED HEALTH CARE EDUCATION/TRAINING PROGRAM

## 2024-10-26 PROCEDURE — 700101 HCHG RX REV CODE 250: Performed by: ANESTHESIOLOGY

## 2024-10-26 PROCEDURE — 26990 DRAINAGE OF PELVIS LESION: CPT | Mod: 78,LT | Performed by: STUDENT IN AN ORGANIZED HEALTH CARE EDUCATION/TRAINING PROGRAM

## 2024-10-26 PROCEDURE — 87040 BLOOD CULTURE FOR BACTERIA: CPT | Mod: 91

## 2024-10-26 PROCEDURE — 87077 CULTURE AEROBIC IDENTIFY: CPT

## 2024-10-26 PROCEDURE — 700111 HCHG RX REV CODE 636 W/ 250 OVERRIDE (IP): Performed by: ANESTHESIOLOGY

## 2024-10-26 PROCEDURE — 700111 HCHG RX REV CODE 636 W/ 250 OVERRIDE (IP): Mod: JZ | Performed by: ANESTHESIOLOGY

## 2024-10-26 PROCEDURE — 87075 CULTR BACTERIA EXCEPT BLOOD: CPT

## 2024-10-26 PROCEDURE — 87186 SC STD MICRODIL/AGAR DIL: CPT

## 2024-10-26 PROCEDURE — 99221 1ST HOSP IP/OBS SF/LOW 40: CPT | Mod: 57,24 | Performed by: STUDENT IN AN ORGANIZED HEALTH CARE EDUCATION/TRAINING PROGRAM

## 2024-10-26 PROCEDURE — 99233 SBSQ HOSP IP/OBS HIGH 50: CPT | Performed by: STUDENT IN AN ORGANIZED HEALTH CARE EDUCATION/TRAINING PROGRAM

## 2024-10-26 PROCEDURE — 87102 FUNGUS ISOLATION CULTURE: CPT

## 2024-10-26 PROCEDURE — 0JBM0ZZ EXCISION OF LEFT UPPER LEG SUBCUTANEOUS TISSUE AND FASCIA, OPEN APPROACH: ICD-10-PCS | Performed by: STUDENT IN AN ORGANIZED HEALTH CARE EDUCATION/TRAINING PROGRAM

## 2024-10-26 PROCEDURE — 160039 HCHG SURGERY MINUTES - EA ADDL 1 MIN LEVEL 3: Performed by: STUDENT IN AN ORGANIZED HEALTH CARE EDUCATION/TRAINING PROGRAM

## 2024-10-26 PROCEDURE — A9270 NON-COVERED ITEM OR SERVICE: HCPCS | Performed by: ANESTHESIOLOGY

## 2024-10-26 PROCEDURE — 700102 HCHG RX REV CODE 250 W/ 637 OVERRIDE(OP): Performed by: ANESTHESIOLOGY

## 2024-10-26 PROCEDURE — 87070 CULTURE OTHR SPECIMN AEROBIC: CPT

## 2024-10-26 PROCEDURE — 700105 HCHG RX REV CODE 258: Performed by: ANESTHESIOLOGY

## 2024-10-26 PROCEDURE — 160002 HCHG RECOVERY MINUTES (STAT): Performed by: STUDENT IN AN ORGANIZED HEALTH CARE EDUCATION/TRAINING PROGRAM

## 2024-10-26 PROCEDURE — 160009 HCHG ANES TIME/MIN: Performed by: STUDENT IN AN ORGANIZED HEALTH CARE EDUCATION/TRAINING PROGRAM

## 2024-10-26 PROCEDURE — 770001 HCHG ROOM/CARE - MED/SURG/GYN PRIV*

## 2024-10-26 RX ORDER — SODIUM CHLORIDE 9 MG/ML
INJECTION, SOLUTION INTRAVENOUS CONTINUOUS
Status: DISCONTINUED | OUTPATIENT
Start: 2024-10-26 | End: 2024-10-26 | Stop reason: HOSPADM

## 2024-10-26 RX ORDER — DEXAMETHASONE SODIUM PHOSPHATE 4 MG/ML
INJECTION, SOLUTION INTRA-ARTICULAR; INTRALESIONAL; INTRAMUSCULAR; INTRAVENOUS; SOFT TISSUE PRN
Status: DISCONTINUED | OUTPATIENT
Start: 2024-10-26 | End: 2024-10-26 | Stop reason: SURG

## 2024-10-26 RX ORDER — ONDANSETRON 2 MG/ML
4 INJECTION INTRAMUSCULAR; INTRAVENOUS
Status: DISCONTINUED | OUTPATIENT
Start: 2024-10-26 | End: 2024-10-26 | Stop reason: HOSPADM

## 2024-10-26 RX ORDER — LIDOCAINE HYDROCHLORIDE 20 MG/ML
INJECTION, SOLUTION EPIDURAL; INFILTRATION; INTRACAUDAL; PERINEURAL PRN
Status: DISCONTINUED | OUTPATIENT
Start: 2024-10-26 | End: 2024-10-26 | Stop reason: SURG

## 2024-10-26 RX ORDER — HYDROMORPHONE HYDROCHLORIDE 1 MG/ML
0.4 INJECTION, SOLUTION INTRAMUSCULAR; INTRAVENOUS; SUBCUTANEOUS
Status: DISCONTINUED | OUTPATIENT
Start: 2024-10-26 | End: 2024-10-26 | Stop reason: HOSPADM

## 2024-10-26 RX ORDER — EPHEDRINE SULFATE 50 MG/ML
5 INJECTION, SOLUTION INTRAVENOUS
Status: DISCONTINUED | OUTPATIENT
Start: 2024-10-26 | End: 2024-10-26 | Stop reason: HOSPADM

## 2024-10-26 RX ORDER — HYDRALAZINE HYDROCHLORIDE 20 MG/ML
5 INJECTION INTRAMUSCULAR; INTRAVENOUS
Status: DISCONTINUED | OUTPATIENT
Start: 2024-10-26 | End: 2024-10-26 | Stop reason: HOSPADM

## 2024-10-26 RX ORDER — HYDROMORPHONE HYDROCHLORIDE 1 MG/ML
0.2 INJECTION, SOLUTION INTRAMUSCULAR; INTRAVENOUS; SUBCUTANEOUS
Status: DISCONTINUED | OUTPATIENT
Start: 2024-10-26 | End: 2024-10-26 | Stop reason: HOSPADM

## 2024-10-26 RX ORDER — DIPHENHYDRAMINE HYDROCHLORIDE 50 MG/ML
12.5 INJECTION INTRAMUSCULAR; INTRAVENOUS
Status: DISCONTINUED | OUTPATIENT
Start: 2024-10-26 | End: 2024-10-26 | Stop reason: HOSPADM

## 2024-10-26 RX ORDER — ONDANSETRON 2 MG/ML
INJECTION INTRAMUSCULAR; INTRAVENOUS PRN
Status: DISCONTINUED | OUTPATIENT
Start: 2024-10-26 | End: 2024-10-26 | Stop reason: SURG

## 2024-10-26 RX ORDER — ROCURONIUM BROMIDE 10 MG/ML
INJECTION, SOLUTION INTRAVENOUS PRN
Status: DISCONTINUED | OUTPATIENT
Start: 2024-10-26 | End: 2024-10-26 | Stop reason: SURG

## 2024-10-26 RX ORDER — ALBUTEROL SULFATE 5 MG/ML
2.5 SOLUTION RESPIRATORY (INHALATION)
Status: DISCONTINUED | OUTPATIENT
Start: 2024-10-26 | End: 2024-10-26 | Stop reason: HOSPADM

## 2024-10-26 RX ORDER — SODIUM CHLORIDE 9 MG/ML
INJECTION, SOLUTION INTRAVENOUS
Status: DISCONTINUED | OUTPATIENT
Start: 2024-10-26 | End: 2024-10-26 | Stop reason: SURG

## 2024-10-26 RX ORDER — OXYCODONE HCL 5 MG/5 ML
10 SOLUTION, ORAL ORAL
Status: COMPLETED | OUTPATIENT
Start: 2024-10-26 | End: 2024-10-26

## 2024-10-26 RX ORDER — KETOROLAC TROMETHAMINE 15 MG/ML
INJECTION, SOLUTION INTRAMUSCULAR; INTRAVENOUS PRN
Status: DISCONTINUED | OUTPATIENT
Start: 2024-10-26 | End: 2024-10-26 | Stop reason: SURG

## 2024-10-26 RX ORDER — PHENYLEPHRINE HCL IN 0.9% NACL 1 MG/10 ML
SYRINGE (ML) INTRAVENOUS PRN
Status: DISCONTINUED | OUTPATIENT
Start: 2024-10-26 | End: 2024-10-26 | Stop reason: SURG

## 2024-10-26 RX ORDER — MIDAZOLAM HYDROCHLORIDE 1 MG/ML
1 INJECTION INTRAMUSCULAR; INTRAVENOUS
Status: DISCONTINUED | OUTPATIENT
Start: 2024-10-26 | End: 2024-10-26 | Stop reason: HOSPADM

## 2024-10-26 RX ORDER — HALOPERIDOL 5 MG/ML
1 INJECTION INTRAMUSCULAR
Status: DISCONTINUED | OUTPATIENT
Start: 2024-10-26 | End: 2024-10-26 | Stop reason: HOSPADM

## 2024-10-26 RX ORDER — CEFAZOLIN SODIUM 1 G/3ML
INJECTION, POWDER, FOR SOLUTION INTRAMUSCULAR; INTRAVENOUS PRN
Status: DISCONTINUED | OUTPATIENT
Start: 2024-10-26 | End: 2024-10-26 | Stop reason: SURG

## 2024-10-26 RX ORDER — MEPERIDINE HYDROCHLORIDE 25 MG/ML
12.5 INJECTION INTRAMUSCULAR; INTRAVENOUS; SUBCUTANEOUS
Status: DISCONTINUED | OUTPATIENT
Start: 2024-10-26 | End: 2024-10-26 | Stop reason: HOSPADM

## 2024-10-26 RX ORDER — OXYCODONE HCL 5 MG/5 ML
5 SOLUTION, ORAL ORAL
Status: COMPLETED | OUTPATIENT
Start: 2024-10-26 | End: 2024-10-26

## 2024-10-26 RX ORDER — HYDROMORPHONE HYDROCHLORIDE 1 MG/ML
0.1 INJECTION, SOLUTION INTRAMUSCULAR; INTRAVENOUS; SUBCUTANEOUS
Status: DISCONTINUED | OUTPATIENT
Start: 2024-10-26 | End: 2024-10-26 | Stop reason: HOSPADM

## 2024-10-26 RX ADMIN — CEFAZOLIN 2 G: 10 INJECTION, POWDER, FOR SOLUTION INTRAVENOUS at 06:07

## 2024-10-26 RX ADMIN — ROCURONIUM BROMIDE 50 MG: 50 INJECTION, SOLUTION INTRAVENOUS at 16:26

## 2024-10-26 RX ADMIN — FENTANYL CITRATE 50 MCG: 50 INJECTION, SOLUTION INTRAMUSCULAR; INTRAVENOUS at 17:24

## 2024-10-26 RX ADMIN — ONDANSETRON 4 MG: 2 INJECTION INTRAMUSCULAR; INTRAVENOUS at 16:51

## 2024-10-26 RX ADMIN — MEPERIDINE HYDROCHLORIDE 12.5 MG: 25 INJECTION INTRAMUSCULAR; INTRAVENOUS; SUBCUTANEOUS at 17:34

## 2024-10-26 RX ADMIN — HYDROMORPHONE HYDROCHLORIDE 0.4 MG: 1 INJECTION, SOLUTION INTRAMUSCULAR; INTRAVENOUS; SUBCUTANEOUS at 17:40

## 2024-10-26 RX ADMIN — KETOROLAC TROMETHAMINE 15 MG: 15 INJECTION, SOLUTION INTRAMUSCULAR; INTRAVENOUS at 16:51

## 2024-10-26 RX ADMIN — PROPOFOL 150 MG: 10 INJECTION, EMULSION INTRAVENOUS at 16:26

## 2024-10-26 RX ADMIN — DEXAMETHASONE SODIUM PHOSPHATE 8 MG: 4 INJECTION INTRA-ARTICULAR; INTRALESIONAL; INTRAMUSCULAR; INTRAVENOUS; SOFT TISSUE at 16:35

## 2024-10-26 RX ADMIN — FENTANYL CITRATE 100 MCG: 50 INJECTION, SOLUTION INTRAMUSCULAR; INTRAVENOUS at 16:22

## 2024-10-26 RX ADMIN — Medication 200 MCG: at 16:44

## 2024-10-26 RX ADMIN — CEFAZOLIN 2 G: 10 INJECTION, POWDER, FOR SOLUTION INTRAVENOUS at 22:14

## 2024-10-26 RX ADMIN — FENTANYL CITRATE 50 MCG: 50 INJECTION, SOLUTION INTRAMUSCULAR; INTRAVENOUS at 17:31

## 2024-10-26 RX ADMIN — SUGAMMADEX 200 MG: 100 INJECTION, SOLUTION INTRAVENOUS at 17:03

## 2024-10-26 RX ADMIN — CEFAZOLIN 2 G: 1 INJECTION, POWDER, FOR SOLUTION INTRAMUSCULAR; INTRAVENOUS at 16:32

## 2024-10-26 RX ADMIN — LIDOCAINE HYDROCHLORIDE 100 MG: 20 INJECTION, SOLUTION EPIDURAL; INFILTRATION; INTRACAUDAL at 16:26

## 2024-10-26 RX ADMIN — SODIUM CHLORIDE: 9 INJECTION, SOLUTION INTRAVENOUS at 16:20

## 2024-10-26 RX ADMIN — Medication 200 MCG: at 16:38

## 2024-10-26 RX ADMIN — OXYCODONE HYDROCHLORIDE 10 MG: 5 SOLUTION ORAL at 17:19

## 2024-10-26 ASSESSMENT — PAIN DESCRIPTION - PAIN TYPE
TYPE: SURGICAL PAIN
TYPE: SURGICAL PAIN
TYPE: ACUTE PAIN
TYPE: SURGICAL PAIN
TYPE: ACUTE PAIN
TYPE: ACUTE PAIN
TYPE: SURGICAL PAIN
TYPE: ACUTE PAIN
TYPE: SURGICAL PAIN
TYPE: ACUTE PAIN
TYPE: SURGICAL PAIN
TYPE: ACUTE PAIN

## 2024-10-26 ASSESSMENT — ENCOUNTER SYMPTOMS
MYALGIAS: 1
WEAKNESS: 1

## 2024-10-26 ASSESSMENT — PAIN SCALES - GENERAL: PAIN_LEVEL: 2

## 2024-10-27 LAB
ANION GAP SERPL CALC-SCNC: 8 MMOL/L (ref 7–16)
BUN SERPL-MCNC: 9 MG/DL (ref 8–22)
CALCIUM SERPL-MCNC: 9.2 MG/DL (ref 8.5–10.5)
CHLORIDE SERPL-SCNC: 103 MMOL/L (ref 96–112)
CO2 SERPL-SCNC: 23 MMOL/L (ref 20–33)
CREAT SERPL-MCNC: 0.73 MG/DL (ref 0.5–1.4)
ERYTHROCYTE [DISTWIDTH] IN BLOOD BY AUTOMATED COUNT: 46 FL (ref 35.9–50)
GFR SERPLBLD CREATININE-BSD FMLA CKD-EPI: 86 ML/MIN/1.73 M 2
GLUCOSE SERPL-MCNC: 136 MG/DL (ref 65–99)
HCT VFR BLD AUTO: 32.9 % (ref 37–47)
HGB BLD-MCNC: 10.9 G/DL (ref 12–16)
MAGNESIUM SERPL-MCNC: 2 MG/DL (ref 1.5–2.5)
MCH RBC QN AUTO: 31.9 PG (ref 27–33)
MCHC RBC AUTO-ENTMCNC: 33.1 G/DL (ref 32.2–35.5)
MCV RBC AUTO: 96.2 FL (ref 81.4–97.8)
PHOSPHATE SERPL-MCNC: 3.6 MG/DL (ref 2.5–4.5)
PLATELET # BLD AUTO: 322 K/UL (ref 164–446)
PMV BLD AUTO: 9.7 FL (ref 9–12.9)
POTASSIUM SERPL-SCNC: 4.2 MMOL/L (ref 3.6–5.5)
RBC # BLD AUTO: 3.42 M/UL (ref 4.2–5.4)
SODIUM SERPL-SCNC: 134 MMOL/L (ref 135–145)
WBC # BLD AUTO: 5.1 K/UL (ref 4.8–10.8)

## 2024-10-27 PROCEDURE — 700105 HCHG RX REV CODE 258: Performed by: STUDENT IN AN ORGANIZED HEALTH CARE EDUCATION/TRAINING PROGRAM

## 2024-10-27 PROCEDURE — 700111 HCHG RX REV CODE 636 W/ 250 OVERRIDE (IP): Mod: JZ | Performed by: STUDENT IN AN ORGANIZED HEALTH CARE EDUCATION/TRAINING PROGRAM

## 2024-10-27 PROCEDURE — 84100 ASSAY OF PHOSPHORUS: CPT

## 2024-10-27 PROCEDURE — 83735 ASSAY OF MAGNESIUM: CPT

## 2024-10-27 PROCEDURE — 36415 COLL VENOUS BLD VENIPUNCTURE: CPT

## 2024-10-27 PROCEDURE — 99233 SBSQ HOSP IP/OBS HIGH 50: CPT | Performed by: STUDENT IN AN ORGANIZED HEALTH CARE EDUCATION/TRAINING PROGRAM

## 2024-10-27 PROCEDURE — 770001 HCHG ROOM/CARE - MED/SURG/GYN PRIV*

## 2024-10-27 PROCEDURE — 700111 HCHG RX REV CODE 636 W/ 250 OVERRIDE (IP): Performed by: STUDENT IN AN ORGANIZED HEALTH CARE EDUCATION/TRAINING PROGRAM

## 2024-10-27 PROCEDURE — 700102 HCHG RX REV CODE 250 W/ 637 OVERRIDE(OP): Performed by: STUDENT IN AN ORGANIZED HEALTH CARE EDUCATION/TRAINING PROGRAM

## 2024-10-27 PROCEDURE — 85027 COMPLETE CBC AUTOMATED: CPT

## 2024-10-27 PROCEDURE — 80048 BASIC METABOLIC PNL TOTAL CA: CPT

## 2024-10-27 PROCEDURE — A9270 NON-COVERED ITEM OR SERVICE: HCPCS | Performed by: STUDENT IN AN ORGANIZED HEALTH CARE EDUCATION/TRAINING PROGRAM

## 2024-10-27 RX ORDER — ENOXAPARIN SODIUM 100 MG/ML
40 INJECTION SUBCUTANEOUS DAILY
Status: DISCONTINUED | OUTPATIENT
Start: 2024-10-27 | End: 2024-10-29 | Stop reason: HOSPADM

## 2024-10-27 RX ADMIN — CEFAZOLIN 2 G: 10 INJECTION, POWDER, FOR SOLUTION INTRAVENOUS at 13:25

## 2024-10-27 RX ADMIN — CEFAZOLIN 2 G: 10 INJECTION, POWDER, FOR SOLUTION INTRAVENOUS at 21:29

## 2024-10-27 RX ADMIN — OXYCODONE 5 MG: 5 TABLET ORAL at 06:05

## 2024-10-27 RX ADMIN — OXYCODONE 5 MG: 5 TABLET ORAL at 15:14

## 2024-10-27 RX ADMIN — OXYCODONE 5 MG: 5 TABLET ORAL at 21:29

## 2024-10-27 RX ADMIN — SENNOSIDES AND DOCUSATE SODIUM 2 TABLET: 50; 8.6 TABLET ORAL at 17:13

## 2024-10-27 RX ADMIN — CEFAZOLIN 2 G: 10 INJECTION, POWDER, FOR SOLUTION INTRAVENOUS at 05:59

## 2024-10-27 RX ADMIN — ENOXAPARIN SODIUM 40 MG: 100 INJECTION SUBCUTANEOUS at 17:13

## 2024-10-27 ASSESSMENT — COGNITIVE AND FUNCTIONAL STATUS - GENERAL
DAILY ACTIVITIY SCORE: 23
MOBILITY SCORE: 23
SUGGESTED CMS G CODE MODIFIER DAILY ACTIVITY: CI
CLIMB 3 TO 5 STEPS WITH RAILING: A LITTLE
SUGGESTED CMS G CODE MODIFIER MOBILITY: CI
HELP NEEDED FOR BATHING: A LITTLE

## 2024-10-27 ASSESSMENT — PAIN DESCRIPTION - PAIN TYPE
TYPE: ACUTE PAIN
TYPE: ACUTE PAIN
TYPE: ACUTE PAIN;SURGICAL PAIN
TYPE: ACUTE PAIN
TYPE: ACUTE PAIN;SURGICAL PAIN
TYPE: ACUTE PAIN

## 2024-10-27 ASSESSMENT — ENCOUNTER SYMPTOMS
WEAKNESS: 1
MYALGIAS: 1

## 2024-10-28 LAB
ANION GAP SERPL CALC-SCNC: 12 MMOL/L (ref 7–16)
BACTERIA TISS AEROBE CULT: ABNORMAL
BACTERIA TISS AEROBE CULT: ABNORMAL
BUN SERPL-MCNC: 12 MG/DL (ref 8–22)
CALCIUM SERPL-MCNC: 9.2 MG/DL (ref 8.5–10.5)
CHLORIDE SERPL-SCNC: 102 MMOL/L (ref 96–112)
CO2 SERPL-SCNC: 22 MMOL/L (ref 20–33)
CREAT SERPL-MCNC: 0.78 MG/DL (ref 0.5–1.4)
ERYTHROCYTE [DISTWIDTH] IN BLOOD BY AUTOMATED COUNT: 45.9 FL (ref 35.9–50)
GFR SERPLBLD CREATININE-BSD FMLA CKD-EPI: 80 ML/MIN/1.73 M 2
GLUCOSE SERPL-MCNC: 128 MG/DL (ref 65–99)
GRAM STN SPEC: ABNORMAL
HCT VFR BLD AUTO: 32.8 % (ref 37–47)
HGB BLD-MCNC: 10.8 G/DL (ref 12–16)
MCH RBC QN AUTO: 31.7 PG (ref 27–33)
MCHC RBC AUTO-ENTMCNC: 32.9 G/DL (ref 32.2–35.5)
MCV RBC AUTO: 96.2 FL (ref 81.4–97.8)
PLATELET # BLD AUTO: 306 K/UL (ref 164–446)
PMV BLD AUTO: 9.9 FL (ref 9–12.9)
POTASSIUM SERPL-SCNC: 4 MMOL/L (ref 3.6–5.5)
RBC # BLD AUTO: 3.41 M/UL (ref 4.2–5.4)
SIGNIFICANT IND 70042: ABNORMAL
SITE SITE: ABNORMAL
SODIUM SERPL-SCNC: 136 MMOL/L (ref 135–145)
SOURCE SOURCE: ABNORMAL
WBC # BLD AUTO: 5.5 K/UL (ref 4.8–10.8)

## 2024-10-28 PROCEDURE — 99223 1ST HOSP IP/OBS HIGH 75: CPT | Performed by: INTERNAL MEDICINE

## 2024-10-28 PROCEDURE — 700111 HCHG RX REV CODE 636 W/ 250 OVERRIDE (IP): Performed by: STUDENT IN AN ORGANIZED HEALTH CARE EDUCATION/TRAINING PROGRAM

## 2024-10-28 PROCEDURE — 700102 HCHG RX REV CODE 250 W/ 637 OVERRIDE(OP): Performed by: STUDENT IN AN ORGANIZED HEALTH CARE EDUCATION/TRAINING PROGRAM

## 2024-10-28 PROCEDURE — 700102 HCHG RX REV CODE 250 W/ 637 OVERRIDE(OP): Performed by: INTERNAL MEDICINE

## 2024-10-28 PROCEDURE — 700111 HCHG RX REV CODE 636 W/ 250 OVERRIDE (IP): Mod: JZ | Performed by: STUDENT IN AN ORGANIZED HEALTH CARE EDUCATION/TRAINING PROGRAM

## 2024-10-28 PROCEDURE — A9270 NON-COVERED ITEM OR SERVICE: HCPCS | Performed by: INTERNAL MEDICINE

## 2024-10-28 PROCEDURE — A9270 NON-COVERED ITEM OR SERVICE: HCPCS | Performed by: STUDENT IN AN ORGANIZED HEALTH CARE EDUCATION/TRAINING PROGRAM

## 2024-10-28 PROCEDURE — 85027 COMPLETE CBC AUTOMATED: CPT

## 2024-10-28 PROCEDURE — 80048 BASIC METABOLIC PNL TOTAL CA: CPT

## 2024-10-28 PROCEDURE — 700105 HCHG RX REV CODE 258: Performed by: STUDENT IN AN ORGANIZED HEALTH CARE EDUCATION/TRAINING PROGRAM

## 2024-10-28 PROCEDURE — 99233 SBSQ HOSP IP/OBS HIGH 50: CPT | Performed by: STUDENT IN AN ORGANIZED HEALTH CARE EDUCATION/TRAINING PROGRAM

## 2024-10-28 PROCEDURE — 770001 HCHG ROOM/CARE - MED/SURG/GYN PRIV*

## 2024-10-28 RX ORDER — EPINEPHRINE 1 MG/ML(1)
0.3 AMPUL (ML) INJECTION
Status: DISPENSED | OUTPATIENT
Start: 2024-10-28 | End: 2024-10-28

## 2024-10-28 RX ORDER — DIPHENHYDRAMINE HYDROCHLORIDE 50 MG/ML
25 INJECTION INTRAMUSCULAR; INTRAVENOUS
Status: ACTIVE | OUTPATIENT
Start: 2024-10-28 | End: 2024-10-28

## 2024-10-28 RX ORDER — METHYLPREDNISOLONE SODIUM SUCCINATE 125 MG/2ML
125 INJECTION, POWDER, LYOPHILIZED, FOR SOLUTION INTRAMUSCULAR; INTRAVENOUS
Status: ACTIVE | OUTPATIENT
Start: 2024-10-28 | End: 2024-10-28

## 2024-10-28 RX ORDER — AMOXICILLIN 250 MG/1
250 CAPSULE ORAL ONCE
Status: COMPLETED | OUTPATIENT
Start: 2024-10-28 | End: 2024-10-28

## 2024-10-28 RX ORDER — RIFAMPIN 300 MG/1
300 CAPSULE ORAL 2 TIMES DAILY
Status: DISCONTINUED | OUTPATIENT
Start: 2024-10-28 | End: 2024-10-29 | Stop reason: HOSPADM

## 2024-10-28 RX ADMIN — RIFAMPIN 300 MG: 300 CAPSULE ORAL at 13:24

## 2024-10-28 RX ADMIN — AMOXICILLIN 250 MG: 250 CAPSULE ORAL at 13:25

## 2024-10-28 RX ADMIN — ENOXAPARIN SODIUM 40 MG: 100 INJECTION SUBCUTANEOUS at 17:39

## 2024-10-28 RX ADMIN — CEFAZOLIN 2 G: 10 INJECTION, POWDER, FOR SOLUTION INTRAVENOUS at 05:54

## 2024-10-28 RX ADMIN — POLYETHYLENE GLYCOL 3350 1 PACKET: 17 POWDER, FOR SOLUTION ORAL at 09:20

## 2024-10-28 RX ADMIN — CEFAZOLIN 2 G: 10 INJECTION, POWDER, FOR SOLUTION INTRAVENOUS at 21:50

## 2024-10-28 RX ADMIN — RIFAMPIN 300 MG: 300 CAPSULE ORAL at 17:39

## 2024-10-28 RX ADMIN — SENNOSIDES AND DOCUSATE SODIUM 2 TABLET: 50; 8.6 TABLET ORAL at 17:39

## 2024-10-28 RX ADMIN — CEFAZOLIN 2 G: 10 INJECTION, POWDER, FOR SOLUTION INTRAVENOUS at 14:00

## 2024-10-28 ASSESSMENT — PAIN DESCRIPTION - PAIN TYPE
TYPE: ACUTE PAIN

## 2024-10-28 ASSESSMENT — ENCOUNTER SYMPTOMS
MYALGIAS: 1
WEAKNESS: 1

## 2024-10-29 VITALS
TEMPERATURE: 97.3 F | DIASTOLIC BLOOD PRESSURE: 68 MMHG | OXYGEN SATURATION: 97 % | BODY MASS INDEX: 22.28 KG/M2 | WEIGHT: 147 LBS | HEART RATE: 64 BPM | HEIGHT: 68 IN | RESPIRATION RATE: 14 BRPM | SYSTOLIC BLOOD PRESSURE: 127 MMHG

## 2024-10-29 DIAGNOSIS — T81.49XA LEFT HIP POSTOPERATIVE WOUND INFECTION: ICD-10-CM

## 2024-10-29 DIAGNOSIS — A49.01 MSSA (METHICILLIN SUSCEPTIBLE STAPHYLOCOCCUS AUREUS) INFECTION: ICD-10-CM

## 2024-10-29 DIAGNOSIS — T84.7XXA HARDWARE COMPLICATING WOUND INFECTION, INITIAL ENCOUNTER (HCC): ICD-10-CM

## 2024-10-29 LAB
ANION GAP SERPL CALC-SCNC: 8 MMOL/L (ref 7–16)
BUN SERPL-MCNC: 12 MG/DL (ref 8–22)
CALCIUM SERPL-MCNC: 9.6 MG/DL (ref 8.5–10.5)
CHLORIDE SERPL-SCNC: 103 MMOL/L (ref 96–112)
CO2 SERPL-SCNC: 27 MMOL/L (ref 20–33)
CREAT SERPL-MCNC: 0.76 MG/DL (ref 0.5–1.4)
ERYTHROCYTE [DISTWIDTH] IN BLOOD BY AUTOMATED COUNT: 45.6 FL (ref 35.9–50)
FUNGUS SPEC CULT: NORMAL
FUNGUS SPEC FUNGUS STN: NORMAL
GFR SERPLBLD CREATININE-BSD FMLA CKD-EPI: 82 ML/MIN/1.73 M 2
GLUCOSE SERPL-MCNC: 91 MG/DL (ref 65–99)
HCT VFR BLD AUTO: 36.7 % (ref 37–47)
HGB BLD-MCNC: 12 G/DL (ref 12–16)
MCH RBC QN AUTO: 31.6 PG (ref 27–33)
MCHC RBC AUTO-ENTMCNC: 32.7 G/DL (ref 32.2–35.5)
MCV RBC AUTO: 96.6 FL (ref 81.4–97.8)
PLATELET # BLD AUTO: 325 K/UL (ref 164–446)
PMV BLD AUTO: 9.9 FL (ref 9–12.9)
POTASSIUM SERPL-SCNC: 4.4 MMOL/L (ref 3.6–5.5)
RBC # BLD AUTO: 3.8 M/UL (ref 4.2–5.4)
SIGNIFICANT IND 70042: NORMAL
SITE SITE: NORMAL
SODIUM SERPL-SCNC: 138 MMOL/L (ref 135–145)
SOURCE SOURCE: NORMAL
WBC # BLD AUTO: 4.7 K/UL (ref 4.8–10.8)

## 2024-10-29 PROCEDURE — 80048 BASIC METABOLIC PNL TOTAL CA: CPT

## 2024-10-29 PROCEDURE — A9270 NON-COVERED ITEM OR SERVICE: HCPCS | Performed by: INTERNAL MEDICINE

## 2024-10-29 PROCEDURE — A9270 NON-COVERED ITEM OR SERVICE: HCPCS | Performed by: STUDENT IN AN ORGANIZED HEALTH CARE EDUCATION/TRAINING PROGRAM

## 2024-10-29 PROCEDURE — A9270 NON-COVERED ITEM OR SERVICE: HCPCS

## 2024-10-29 PROCEDURE — 700102 HCHG RX REV CODE 250 W/ 637 OVERRIDE(OP): Performed by: STUDENT IN AN ORGANIZED HEALTH CARE EDUCATION/TRAINING PROGRAM

## 2024-10-29 PROCEDURE — 700105 HCHG RX REV CODE 258: Performed by: STUDENT IN AN ORGANIZED HEALTH CARE EDUCATION/TRAINING PROGRAM

## 2024-10-29 PROCEDURE — 700102 HCHG RX REV CODE 250 W/ 637 OVERRIDE(OP)

## 2024-10-29 PROCEDURE — 85027 COMPLETE CBC AUTOMATED: CPT

## 2024-10-29 PROCEDURE — 700111 HCHG RX REV CODE 636 W/ 250 OVERRIDE (IP): Performed by: STUDENT IN AN ORGANIZED HEALTH CARE EDUCATION/TRAINING PROGRAM

## 2024-10-29 PROCEDURE — 99239 HOSP IP/OBS DSCHRG MGMT >30: CPT | Performed by: INTERNAL MEDICINE

## 2024-10-29 PROCEDURE — 700102 HCHG RX REV CODE 250 W/ 637 OVERRIDE(OP): Performed by: INTERNAL MEDICINE

## 2024-10-29 PROCEDURE — 99233 SBSQ HOSP IP/OBS HIGH 50: CPT | Performed by: INTERNAL MEDICINE

## 2024-10-29 RX ORDER — RIFAMPIN 300 MG/1
300 CAPSULE ORAL 2 TIMES DAILY
Qty: 76 CAPSULE | Refills: 0 | Status: ACTIVE | OUTPATIENT
Start: 2024-10-29 | End: 2024-12-06

## 2024-10-29 RX ORDER — ACETAMINOPHEN 325 MG/1
650 TABLET ORAL EVERY 6 HOURS PRN
COMMUNITY
Start: 2024-10-29

## 2024-10-29 RX ORDER — OXYCODONE HYDROCHLORIDE 5 MG/1
5 TABLET ORAL EVERY 6 HOURS PRN
Qty: 12 TABLET | Refills: 0 | Status: SHIPPED | OUTPATIENT
Start: 2024-10-29 | End: 2024-11-01

## 2024-10-29 RX ORDER — RIFAMPIN 300 MG/1
300 CAPSULE ORAL 2 TIMES DAILY
Qty: 76 CAPSULE | Refills: 0 | Status: ACTIVE | OUTPATIENT
Start: 2024-10-29 | End: 2024-10-29

## 2024-10-29 RX ORDER — CYCLOBENZAPRINE HCL 10 MG
10 TABLET ORAL 3 TIMES DAILY PRN
Qty: 30 TABLET | Refills: 0 | Status: SHIPPED | OUTPATIENT
Start: 2024-10-29

## 2024-10-29 RX ORDER — OXYCODONE HYDROCHLORIDE 5 MG/1
5 TABLET ORAL EVERY 6 HOURS PRN
Qty: 12 TABLET | Refills: 0 | Status: SHIPPED | OUTPATIENT
Start: 2024-10-29 | End: 2024-10-29

## 2024-10-29 RX ORDER — AMOXICILLIN 250 MG
2 CAPSULE ORAL EVERY EVENING
COMMUNITY
Start: 2024-10-29

## 2024-10-29 RX ORDER — CYCLOBENZAPRINE HCL 10 MG
10 TABLET ORAL 3 TIMES DAILY PRN
Status: DISCONTINUED | OUTPATIENT
Start: 2024-10-29 | End: 2024-10-29 | Stop reason: HOSPADM

## 2024-10-29 RX ORDER — CYCLOBENZAPRINE HCL 10 MG
10 TABLET ORAL 3 TIMES DAILY PRN
Qty: 30 TABLET | Refills: 0 | Status: SHIPPED | OUTPATIENT
Start: 2024-10-29 | End: 2024-10-29

## 2024-10-29 RX ORDER — POLYETHYLENE GLYCOL 3350 17 G/17G
17 POWDER, FOR SOLUTION ORAL
COMMUNITY
Start: 2024-10-29 | End: 2024-11-27

## 2024-10-29 RX ADMIN — OXYCODONE HYDROCHLORIDE 10 MG: 10 TABLET ORAL at 00:14

## 2024-10-29 RX ADMIN — CEFAZOLIN 2 G: 10 INJECTION, POWDER, FOR SOLUTION INTRAVENOUS at 14:34

## 2024-10-29 RX ADMIN — OXYCODONE 5 MG: 5 TABLET ORAL at 14:34

## 2024-10-29 RX ADMIN — CEFAZOLIN 2 G: 10 INJECTION, POWDER, FOR SOLUTION INTRAVENOUS at 04:07

## 2024-10-29 RX ADMIN — CYCLOBENZAPRINE 10 MG: 10 TABLET, FILM COATED ORAL at 04:51

## 2024-10-29 RX ADMIN — OXYCODONE 5 MG: 5 TABLET ORAL at 04:07

## 2024-10-29 RX ADMIN — RIFAMPIN 300 MG: 300 CAPSULE ORAL at 04:04

## 2024-10-29 ASSESSMENT — SOCIAL DETERMINANTS OF HEALTH (SDOH)
WITHIN THE LAST YEAR, HAVE YOU BEEN AFRAID OF YOUR PARTNER OR EX-PARTNER?: NO
IN THE PAST 12 MONTHS, HAS THE ELECTRIC, GAS, OIL, OR WATER COMPANY THREATENED TO SHUT OFF SERVICE IN YOUR HOME?: NO
WITHIN THE LAST YEAR, HAVE TO BEEN RAPED OR FORCED TO HAVE ANY KIND OF SEXUAL ACTIVITY BY YOUR PARTNER OR EX-PARTNER?: NO
WITHIN THE PAST 12 MONTHS, YOU WORRIED THAT YOUR FOOD WOULD RUN OUT BEFORE YOU GOT THE MONEY TO BUY MORE: NEVER TRUE
WITHIN THE PAST 12 MONTHS, THE FOOD YOU BOUGHT JUST DIDN'T LAST AND YOU DIDN'T HAVE MONEY TO GET MORE: NEVER TRUE
WITHIN THE LAST YEAR, HAVE YOU BEEN HUMILIATED OR EMOTIONALLY ABUSED IN OTHER WAYS BY YOUR PARTNER OR EX-PARTNER?: NO
WITHIN THE LAST YEAR, HAVE YOU BEEN KICKED, HIT, SLAPPED, OR OTHERWISE PHYSICALLY HURT BY YOUR PARTNER OR EX-PARTNER?: NO

## 2024-10-29 ASSESSMENT — ENCOUNTER SYMPTOMS
FEVER: 0
CHILLS: 0

## 2024-10-29 ASSESSMENT — PAIN DESCRIPTION - PAIN TYPE
TYPE: ACUTE PAIN;SURGICAL PAIN
TYPE: ACUTE PAIN
TYPE: ACUTE PAIN;SURGICAL PAIN
TYPE: ACUTE PAIN

## 2024-10-30 LAB
BACTERIA SPEC ANAEROBE CULT: NORMAL
SIGNIFICANT IND 70042: NORMAL
SITE SITE: NORMAL
SOURCE SOURCE: NORMAL

## 2024-10-31 LAB
BACTERIA BLD CULT: NORMAL
BACTERIA BLD CULT: NORMAL
SIGNIFICANT IND 70042: NORMAL
SIGNIFICANT IND 70042: NORMAL
SITE SITE: NORMAL
SITE SITE: NORMAL
SOURCE SOURCE: NORMAL
SOURCE SOURCE: NORMAL

## 2024-11-07 ENCOUNTER — OFFICE VISIT (OUTPATIENT)
Dept: INFECTIOUS DISEASES | Facility: MEDICAL CENTER | Age: 74
End: 2024-11-07
Attending: NURSE PRACTITIONER
Payer: COMMERCIAL

## 2024-11-07 VITALS
BODY MASS INDEX: 22.28 KG/M2 | RESPIRATION RATE: 20 BRPM | SYSTOLIC BLOOD PRESSURE: 130 MMHG | WEIGHT: 147 LBS | DIASTOLIC BLOOD PRESSURE: 80 MMHG | HEIGHT: 68 IN | HEART RATE: 68 BPM | TEMPERATURE: 98.6 F | OXYGEN SATURATION: 98 %

## 2024-11-07 DIAGNOSIS — T81.49XA LEFT HIP POSTOPERATIVE WOUND INFECTION: ICD-10-CM

## 2024-11-07 DIAGNOSIS — A49.01 MSSA (METHICILLIN SUSCEPTIBLE STAPHYLOCOCCUS AUREUS) INFECTION: ICD-10-CM

## 2024-11-07 DIAGNOSIS — T84.7XXA HARDWARE COMPLICATING WOUND INFECTION, INITIAL ENCOUNTER (HCC): ICD-10-CM

## 2024-11-07 PROCEDURE — 3079F DIAST BP 80-89 MM HG: CPT | Performed by: NURSE PRACTITIONER

## 2024-11-07 PROCEDURE — 99214 OFFICE O/P EST MOD 30 MIN: CPT | Performed by: NURSE PRACTITIONER

## 2024-11-07 PROCEDURE — 3075F SYST BP GE 130 - 139MM HG: CPT | Performed by: NURSE PRACTITIONER

## 2024-11-07 ASSESSMENT — ENCOUNTER SYMPTOMS
SPUTUM PRODUCTION: 0
DIARRHEA: 0
DOUBLE VISION: 0
NAUSEA: 0
NERVOUS/ANXIOUS: 0
SHORTNESS OF BREATH: 0
COUGH: 0
CONSTIPATION: 0
WHEEZING: 0
FOCAL WEAKNESS: 0
CHILLS: 0
FEVER: 0
WEIGHT LOSS: 0
DIZZINESS: 0
PALPITATIONS: 0
MYALGIAS: 0
HEADACHES: 0
BRUISES/BLEEDS EASILY: 0
ABDOMINAL PAIN: 0
VOMITING: 0
BLURRED VISION: 0

## 2024-11-07 ASSESSMENT — FIBROSIS 4 INDEX: FIB4 SCORE: 0.99
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
## 2024-11-26 LAB
FUNGUS SPEC CULT: NORMAL
FUNGUS SPEC FUNGUS STN: NORMAL
SIGNIFICANT IND 70042: NORMAL
SITE SITE: NORMAL
SOURCE SOURCE: NORMAL

## 2024-11-26 ASSESSMENT — ENCOUNTER SYMPTOMS
BRUISES/BLEEDS EASILY: 0
DIZZINESS: 0
FEVER: 0
HEADACHES: 0
ABDOMINAL PAIN: 0
NAUSEA: 0
PALPITATIONS: 0
WEIGHT LOSS: 0
VOMITING: 0
FOCAL WEAKNESS: 0
DOUBLE VISION: 0
DIARRHEA: 0
SPUTUM PRODUCTION: 0
CONSTIPATION: 0
BLURRED VISION: 0
WHEEZING: 0
COUGH: 0
SHORTNESS OF BREATH: 0
CHILLS: 0
NERVOUS/ANXIOUS: 0
MYALGIAS: 0
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
## 2024-11-27 ENCOUNTER — OFFICE VISIT (OUTPATIENT)
Dept: INFECTIOUS DISEASES | Facility: MEDICAL CENTER | Age: 74
End: 2024-11-27
Payer: COMMERCIAL

## 2024-11-27 VITALS
RESPIRATION RATE: 18 BRPM | SYSTOLIC BLOOD PRESSURE: 118 MMHG | HEART RATE: 69 BPM | WEIGHT: 149 LBS | TEMPERATURE: 97.7 F | DIASTOLIC BLOOD PRESSURE: 70 MMHG | BODY MASS INDEX: 22.58 KG/M2 | OXYGEN SATURATION: 99 % | HEIGHT: 68 IN

## 2024-11-27 DIAGNOSIS — T84.7XXA HARDWARE COMPLICATING WOUND INFECTION, INITIAL ENCOUNTER (HCC): ICD-10-CM

## 2024-11-27 DIAGNOSIS — T81.49XA LEFT HIP POSTOPERATIVE WOUND INFECTION: ICD-10-CM

## 2024-11-27 DIAGNOSIS — A49.01 MSSA (METHICILLIN SUSCEPTIBLE STAPHYLOCOCCUS AUREUS) INFECTION: ICD-10-CM

## 2024-11-27 PROCEDURE — 99212 OFFICE O/P EST SF 10 MIN: CPT | Performed by: NURSE PRACTITIONER

## 2024-11-27 PROCEDURE — 3078F DIAST BP <80 MM HG: CPT | Performed by: NURSE PRACTITIONER

## 2024-11-27 PROCEDURE — 99214 OFFICE O/P EST MOD 30 MIN: CPT | Performed by: NURSE PRACTITIONER

## 2024-11-27 PROCEDURE — 3074F SYST BP LT 130 MM HG: CPT | Performed by: NURSE PRACTITIONER

## 2024-11-27 ASSESSMENT — FIBROSIS 4 INDEX: FIB4 SCORE: 1.06

## 2025-01-28 ENCOUNTER — APPOINTMENT (OUTPATIENT)
Dept: INFECTIOUS DISEASES | Facility: MEDICAL CENTER | Age: 75
End: 2025-01-28
Attending: NURSE PRACTITIONER
Payer: COMMERCIAL

## 2025-01-28 ASSESSMENT — ENCOUNTER SYMPTOMS
NERVOUS/ANXIOUS: 0
ABDOMINAL PAIN: 0
BRUISES/BLEEDS EASILY: 0
DIZZINESS: 0
FOCAL WEAKNESS: 0
NAUSEA: 0
DIARRHEA: 0
MYALGIAS: 0
WHEEZING: 0
WEIGHT LOSS: 0
HEADACHES: 0
SHORTNESS OF BREATH: 0
VOMITING: 0
CHILLS: 0
COUGH: 0
CONSTIPATION: 0
BLURRED VISION: 0
DOUBLE VISION: 0
SPUTUM PRODUCTION: 0
PALPITATIONS: 0
FEVER: 0
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
## 2025-01-29 ENCOUNTER — OFFICE VISIT (OUTPATIENT)
Dept: INFECTIOUS DISEASES | Facility: MEDICAL CENTER | Age: 75
End: 2025-01-29
Attending: NURSE PRACTITIONER
Payer: COMMERCIAL

## 2025-01-29 VITALS
OXYGEN SATURATION: 94 % | DIASTOLIC BLOOD PRESSURE: 62 MMHG | SYSTOLIC BLOOD PRESSURE: 120 MMHG | TEMPERATURE: 98 F | HEART RATE: 86 BPM | HEIGHT: 68 IN | BODY MASS INDEX: 23.01 KG/M2 | WEIGHT: 151.8 LBS

## 2025-01-29 DIAGNOSIS — A49.01 MSSA (METHICILLIN SUSCEPTIBLE STAPHYLOCOCCUS AUREUS) INFECTION: ICD-10-CM

## 2025-01-29 DIAGNOSIS — T81.49XA LEFT HIP POSTOPERATIVE WOUND INFECTION: ICD-10-CM

## 2025-01-29 DIAGNOSIS — T84.7XXA HARDWARE COMPLICATING WOUND INFECTION, INITIAL ENCOUNTER (HCC): ICD-10-CM

## 2025-01-29 ASSESSMENT — FIBROSIS 4 INDEX: FIB4 SCORE: 0.97

## (undated) DEVICE — ELECTRODE DUAL RETURN W/ CORD - (50/PK)

## (undated) DEVICE — SET LEADWIRE 5 LEAD BEDSIDE DISPOSABLE ECG (1SET OF 5/EA)

## (undated) DEVICE — GLOVE BIOGEL INDICATOR SZ 8 SURGICAL PF LTX - (50/BX 4BX/CA)

## (undated) DEVICE — COVER LIGHT HANDLE ALC PLUS DISP (18EA/BX)

## (undated) DEVICE — GOWN WARMING STANDARD FLEX - (30/CA)

## (undated) DEVICE — BLADE SAGITTAL SAW DUAL CUT 75.0 X 25.0MM (1/EA)

## (undated) DEVICE — GLOVE BIOGEL SZ 7.5 SURGICAL PF LTX - (50PR/BX 4BX/CA)

## (undated) DEVICE — LACTATED RINGERS INJ 1000 ML - (14EA/CA 60CA/PF)

## (undated) DEVICE — DRESSING AQUACEL AG ADVANTAGE 3.5 X 10" (10EA/BX)"

## (undated) DEVICE — SUTURE 2-0 VICRYL PLUS CT-1 - 8 X 18 INCH(12/BX)

## (undated) DEVICE — GLOVE BIOGEL INDICATOR SZ 6.5 SURGICAL PF LTX - (50PR/BX 4BX/CA)

## (undated) DEVICE — TUBING CLEARLINK DUO-VENT - C-FLO (48EA/CA)

## (undated) DEVICE — GLOVE SZ 7.5 BIOGEL PI MICRO - PF LF (50PR/BX)

## (undated) DEVICE — SUCTION INSTRUMENT YANKAUER BULBOUS TIP W/O VENT (50EA/CA)

## (undated) DEVICE — SLEEVE, VASO, THIGH, MED

## (undated) DEVICE — STAPLER SKIN DISP - (6/BX 10BX/CA) VISISTAT

## (undated) DEVICE — STOCKINETTE IMPERVIOUS 12X48 - STERILELF (10/CA)"

## (undated) DEVICE — GLOVE SIZE 8.0 SURGEON ACCELERATOR FREE GREEN (50PR/BX)

## (undated) DEVICE — SUTURE 5 TI-CRON HOS-14 - (36/BX)

## (undated) DEVICE — SET EXTENSION WITH 2 PORTS (48EA/CA) ***PART #2C8610 IS A SUBSTITUTE*****

## (undated) DEVICE — GLOVE BIOGEL SZ 6 PF LATEX - (50EA/BX 4BX/CA)

## (undated) DEVICE — SUTURE GENERAL

## (undated) DEVICE — SODIUM CHL IRRIGATION 0.9% 1000ML (12EA/CA)

## (undated) DEVICE — SUTURE 0 PDS CT-1 CR 8 X 18 (12PK/BX)"

## (undated) DEVICE — PACK LOWER EXTREMITY - (2/CA)

## (undated) DEVICE — CANISTER SUCTION 3000ML MECHANICAL FILTER AUTO SHUTOFF MEDI-VAC NONSTERILE LF DISP (40EA/CA)

## (undated) DEVICE — CHLORAPREP 26 ML APPLICATOR - ORANGE TINT(25/CA)

## (undated) DEVICE — CONNECTOR 5-IN-1 STERILE - (25EA/BX)

## (undated) DEVICE — DRESSING POST OP BORDER 4 X 10 (5EA/BX)

## (undated) DEVICE — SUTURE 1 VICRYL PLUS CTX - 8 X 18 INCH (12/BX)

## (undated) DEVICE — GOWN SURGEONS X-LARGE - DISP. (30/CA)

## (undated) DEVICE — MIXER BONE CEMENT REVOLUTION - W/FEMORAL PRESSURIZER (6/CA)

## (undated) DEVICE — BONE CEMENT SIMPLEX ANTIBIO - (10/PK)

## (undated) DEVICE — SENSOR OXIMETER ADULT SPO2 RD SET (20EA/BX)

## (undated) DEVICE — PACK TOTAL HIP - (1/CA)

## (undated) DEVICE — CONTAINER SPECIMEN BAG OR - STERILE 4 OZ W/LID (100EA/CA)

## (undated) DEVICE — SUTURE ABSORBABLE VIOLET PDS 2-0 CT-1 L18 IN (12EA/BX)